# Patient Record
Sex: FEMALE | Race: WHITE | NOT HISPANIC OR LATINO
[De-identification: names, ages, dates, MRNs, and addresses within clinical notes are randomized per-mention and may not be internally consistent; named-entity substitution may affect disease eponyms.]

---

## 2017-01-20 ENCOUNTER — APPOINTMENT (OUTPATIENT)
Dept: PAIN MANAGEMENT | Facility: CLINIC | Age: 82
End: 2017-01-20

## 2017-01-20 VITALS
HEART RATE: 83 BPM | DIASTOLIC BLOOD PRESSURE: 80 MMHG | SYSTOLIC BLOOD PRESSURE: 131 MMHG | WEIGHT: 138 LBS | HEIGHT: 61.5 IN | BODY MASS INDEX: 25.72 KG/M2

## 2017-02-06 ENCOUNTER — RX RENEWAL (OUTPATIENT)
Age: 82
End: 2017-02-06

## 2017-03-06 ENCOUNTER — RX RENEWAL (OUTPATIENT)
Age: 82
End: 2017-03-06

## 2017-04-21 ENCOUNTER — APPOINTMENT (OUTPATIENT)
Dept: PAIN MANAGEMENT | Facility: CLINIC | Age: 82
End: 2017-04-21

## 2017-04-21 VITALS
SYSTOLIC BLOOD PRESSURE: 125 MMHG | WEIGHT: 138 LBS | HEIGHT: 61.5 IN | HEART RATE: 76 BPM | BODY MASS INDEX: 25.72 KG/M2 | DIASTOLIC BLOOD PRESSURE: 76 MMHG

## 2018-01-25 ENCOUNTER — RX RENEWAL (OUTPATIENT)
Age: 83
End: 2018-01-25

## 2022-12-06 ENCOUNTER — APPOINTMENT (OUTPATIENT)
Dept: CT IMAGING | Facility: CLINIC | Age: 87
End: 2022-12-06

## 2022-12-06 ENCOUNTER — OUTPATIENT (OUTPATIENT)
Dept: OUTPATIENT SERVICES | Facility: HOSPITAL | Age: 87
LOS: 1 days | End: 2022-12-06
Payer: MEDICARE

## 2022-12-06 DIAGNOSIS — Z00.8 ENCOUNTER FOR OTHER GENERAL EXAMINATION: ICD-10-CM

## 2022-12-06 PROCEDURE — 71260 CT THORAX DX C+: CPT | Mod: 26,MH

## 2022-12-06 PROCEDURE — 70491 CT SOFT TISSUE NECK W/DYE: CPT | Mod: MH

## 2022-12-06 PROCEDURE — 70491 CT SOFT TISSUE NECK W/DYE: CPT | Mod: 26,MH

## 2022-12-06 PROCEDURE — 71260 CT THORAX DX C+: CPT | Mod: MH

## 2023-06-10 ENCOUNTER — INPATIENT (INPATIENT)
Facility: HOSPITAL | Age: 88
LOS: 5 days | Discharge: HOME | DRG: 177 | End: 2023-06-16
Attending: INTERNAL MEDICINE | Admitting: INTERNAL MEDICINE
Payer: MEDICARE

## 2023-06-10 VITALS
HEIGHT: 63 IN | TEMPERATURE: 99 F | SYSTOLIC BLOOD PRESSURE: 118 MMHG | RESPIRATION RATE: 20 BRPM | DIASTOLIC BLOOD PRESSURE: 79 MMHG | OXYGEN SATURATION: 97 % | HEART RATE: 89 BPM | WEIGHT: 128.09 LBS

## 2023-06-10 LAB
ALBUMIN SERPL ELPH-MCNC: 3.7 G/DL — SIGNIFICANT CHANGE UP (ref 3.3–5)
ALP SERPL-CCNC: 79 U/L — SIGNIFICANT CHANGE UP (ref 40–120)
ALT FLD-CCNC: 13 U/L — SIGNIFICANT CHANGE UP (ref 10–45)
ANION GAP SERPL CALC-SCNC: 18 MMOL/L — HIGH (ref 5–17)
AST SERPL-CCNC: 38 U/L — SIGNIFICANT CHANGE UP (ref 10–40)
BASOPHILS # BLD AUTO: 0.04 K/UL — SIGNIFICANT CHANGE UP (ref 0–0.2)
BASOPHILS NFR BLD AUTO: 0.5 % — SIGNIFICANT CHANGE UP (ref 0–2)
BILIRUB SERPL-MCNC: 1 MG/DL — SIGNIFICANT CHANGE UP (ref 0.2–1.2)
BUN SERPL-MCNC: 28 MG/DL — HIGH (ref 7–23)
CALCIUM SERPL-MCNC: 10.1 MG/DL — SIGNIFICANT CHANGE UP (ref 8.4–10.5)
CHLORIDE SERPL-SCNC: 99 MMOL/L — SIGNIFICANT CHANGE UP (ref 96–108)
CO2 SERPL-SCNC: 23 MMOL/L — SIGNIFICANT CHANGE UP (ref 22–31)
CREAT SERPL-MCNC: 0.56 MG/DL — SIGNIFICANT CHANGE UP (ref 0.5–1.3)
EGFR: 88 ML/MIN/1.73M2 — SIGNIFICANT CHANGE UP
EOSINOPHIL # BLD AUTO: 0.08 K/UL — SIGNIFICANT CHANGE UP (ref 0–0.5)
EOSINOPHIL NFR BLD AUTO: 1.1 % — SIGNIFICANT CHANGE UP (ref 0–6)
GLUCOSE SERPL-MCNC: 117 MG/DL — HIGH (ref 70–99)
HCT VFR BLD CALC: 37.3 % — SIGNIFICANT CHANGE UP (ref 34.5–45)
HGB BLD-MCNC: 12.5 G/DL — SIGNIFICANT CHANGE UP (ref 11.5–15.5)
IMM GRANULOCYTES NFR BLD AUTO: 0.3 % — SIGNIFICANT CHANGE UP (ref 0–0.9)
LYMPHOCYTES # BLD AUTO: 0.63 K/UL — LOW (ref 1–3.3)
LYMPHOCYTES # BLD AUTO: 8.4 % — LOW (ref 13–44)
MCHC RBC-ENTMCNC: 31.6 PG — SIGNIFICANT CHANGE UP (ref 27–34)
MCHC RBC-ENTMCNC: 33.5 GM/DL — SIGNIFICANT CHANGE UP (ref 32–36)
MCV RBC AUTO: 94.2 FL — SIGNIFICANT CHANGE UP (ref 80–100)
MONOCYTES # BLD AUTO: 0.57 K/UL — SIGNIFICANT CHANGE UP (ref 0–0.9)
MONOCYTES NFR BLD AUTO: 7.6 % — SIGNIFICANT CHANGE UP (ref 2–14)
NEUTROPHILS # BLD AUTO: 6.18 K/UL — SIGNIFICANT CHANGE UP (ref 1.8–7.4)
NEUTROPHILS NFR BLD AUTO: 82.1 % — HIGH (ref 43–77)
NRBC # BLD: 0 /100 WBCS — SIGNIFICANT CHANGE UP (ref 0–0)
PLATELET # BLD AUTO: 247 K/UL — SIGNIFICANT CHANGE UP (ref 150–400)
POTASSIUM SERPL-MCNC: 5.1 MMOL/L — SIGNIFICANT CHANGE UP (ref 3.5–5.3)
POTASSIUM SERPL-SCNC: 5.1 MMOL/L — SIGNIFICANT CHANGE UP (ref 3.5–5.3)
PROT SERPL-MCNC: 8.5 G/DL — HIGH (ref 6–8.3)
RBC # BLD: 3.96 M/UL — SIGNIFICANT CHANGE UP (ref 3.8–5.2)
RBC # FLD: 13.9 % — SIGNIFICANT CHANGE UP (ref 10.3–14.5)
SODIUM SERPL-SCNC: 140 MMOL/L — SIGNIFICANT CHANGE UP (ref 135–145)
WBC # BLD: 7.52 K/UL — SIGNIFICANT CHANGE UP (ref 3.8–10.5)
WBC # FLD AUTO: 7.52 K/UL — SIGNIFICANT CHANGE UP (ref 3.8–10.5)

## 2023-06-10 PROCEDURE — 71045 X-RAY EXAM CHEST 1 VIEW: CPT | Mod: 26

## 2023-06-10 PROCEDURE — 99285 EMERGENCY DEPT VISIT HI MDM: CPT

## 2023-06-10 RX ORDER — CEFTRIAXONE 500 MG/1
1000 INJECTION, POWDER, FOR SOLUTION INTRAMUSCULAR; INTRAVENOUS ONCE
Refills: 0 | Status: COMPLETED | OUTPATIENT
Start: 2023-06-10 | End: 2023-06-10

## 2023-06-10 RX ORDER — AZITHROMYCIN 500 MG/1
500 TABLET, FILM COATED ORAL ONCE
Refills: 0 | Status: COMPLETED | OUTPATIENT
Start: 2023-06-10 | End: 2023-06-10

## 2023-06-10 NOTE — ED PROVIDER NOTE - PHYSICAL EXAMINATION
Constitutional: VS reviewed. Alert and orientedx3, no apparent distress, pt has gargled speech when talking  HEENT: Atraumatic, EOMI  CV: RRR  Lungs: + course rhonchi in all lung fields. Pt on 2L NC.   Abdomen: Soft, nondistended, nontender  MSK: No deformities  Skin: Warm and dry. As visualized no rashes, lesions, bruising or erythema  Neuro: Appears nonfocal  Lymph: No pitting edema in extremities. Constitutional: VS reviewed. Alert and orientedx3, no apparent distress, pt has gargled speech when talking  HEENT: Atraumatic, EOMI, white exudate on tongue and oropharynx that can be scraped off with tongue depressor   CV: RRR, harsh holosystolic murmur   Lungs: + course rhonchi in all lung fields. Pt on 2L NC.   Abdomen: Soft, nondistended, nontender  MSK: No deformities  Skin: Warm and dry. As visualized no rashes, lesions, bruising or erythema  Neuro: Appears nonfocal  Lymph: No pitting edema in extremities.

## 2023-06-10 NOTE — ED ADULT NURSE NOTE - NSFALLHARMRISKINTERV_ED_ALL_ED
Assistance OOB with selected safe patient handling equipment if applicable/Assistance with ambulation/Communicate risk of Fall with Harm to all staff, patient, and family/Monitor gait and stability/Provide visual cue: red socks, yellow wristband, yellow gown, etc/Reinforce activity limits and safety measures with patient and family/Bed in lowest position, wheels locked, appropriate side rails in place/Call bell, personal items and telephone in reach/Instruct patient to call for assistance before getting out of bed/chair/stretcher/Non-slip footwear applied when patient is off stretcher/[place] to call system/Physically safe environment - no spills, clutter or unnecessary equipment/Purposeful Proactive Rounding/Room/bathroom lighting operational, light cord in reach

## 2023-06-10 NOTE — ED PROVIDER NOTE - ATTENDING CONTRIBUTION TO CARE
Attending MD Nagy: I personally have seen and examined this patient.  Resident note reviewed and agree on plan of care and except where noted.  See below for details.     Seen in [facility], accompanied by daughter  ENT Dr. Timi Langford  PMD Dr. Dave Bowden    88F with PMH/PSH including HTN, thrush on Fluconazole, also on Mercaptopurine presents to the ED with one week of "thrush" and difficulty swallowing.  Patient requested daughter provide HPI.  Patient was able to spell her name and provide her .  Daughter reports that last week patient began to sound junky with breathing and talking.  Reports was Rx'ed Fluconazole and Mercaptopurine by ENT.  Denies previous episode of thrush.  Denies immunosuppressant, history of HIV.  Reports since then has not really improved, has had minimal po intake since 23.  Reports pain with swallowing and while interview has been spitting into emesis bag.  Placed on O2 by EMS, denies use at home.  Denies chest pain, shortness of breath, abdominal pain, nausea, vomiting, diarrhea, urinary complaints, fevers, chills, recent illness.    Exam:   General: NAD. thin  HENT: head NCAT, airway patent, +white patches in mouth  Eyes: anicteric, no conjunctival injection   Lungs: lungs with crackles and rhonchi all lung fields  Cardiac: +S1S2, +murmur, no r/g  GI: abdomen soft with +BS, NT, ND  : no CVAT  MSK: ranging neck freely, no calf tenderness, swelling, erythema or warmth   Neuro: moving all extremities spontaneously, nonfocal  Psych: normal mood and affect     A/P: 88F with     TO BE COMPLETED Attending MD Nagy: I personally have seen and examined this patient.  Resident note reviewed and agree on plan of care and except where noted.  See below for details.     Seen in [facility], accompanied by daughter  ENT Dr. Timi Langford  PMD Dr. Dave Bowden    88F with PMH/PSH including HTN, thrush on Fluconazole, also on Mercaptopurine presents to the ED with one week of "thrush" and difficulty swallowing.  Patient requested daughter provide HPI.  Patient was able to spell her name and provide her .  Daughter reports that last week patient began to sound junky with breathing and talking.  Reports was Rx'ed Fluconazole and Mercaptopurine by ENT.  Denies previous episode of thrush.  Denies immunosuppressant, history of HIV.  Reports since then has not really improved, has had minimal po intake since 23.  Reports pain with swallowing and while interview has been spitting into emesis bag.  Placed on O2 by EMS, denies use at home.  Denies chest pain, shortness of breath, abdominal pain, nausea, vomiting, diarrhea, urinary complaints, fevers, chills, recent illness.    Exam:   General: NAD. thin  HENT: head NCAT, airway patent, +white patches in mouth  Eyes: anicteric, no conjunctival injection   Lungs: lungs with crackles and rhonchi all lung fields  Cardiac: +S1S2, +murmur, no r/g  GI: abdomen soft with +BS, NT, ND  : no CVAT  MSK: ranging neck freely, no calf tenderness, swelling, erythema or warmth   Neuro: moving all extremities spontaneously, nonfocal  Psych: normal mood and affect     A/P: 88F with    TO BE COMPLETED

## 2023-06-10 NOTE — ED PROVIDER NOTE - OBJECTIVE STATEMENT
88-year-old female with past medical history of HTN presents emergency department for 1 week of oral thrush and difficulty swallowing.  Patient is accompanied by daughter who states patient has sounded "junky when breathing and talking" for last week. Pt was placed on 2 antifungals, fluconazole and mercaptopurol 1 week ago.  Patient has had difficulty swallowing as well.  Denies fevers, chills headache chest pain, abdominal pain, nausea/vomiting, diarrhea/constipation, dysuria, hematuria.

## 2023-06-10 NOTE — ED ADULT NURSE NOTE - OBJECTIVE STATEMENT
88 y.o. F A&Ox4 PMHx of HTN presenting to ED via walk-in triage for mouth pain and phlegm. Pt daughter at bedside to help with history, as pt is having difficulty speaking. Pt began having mouth pain 1 week ago, and was dx with thrush from her ENT. Pt prescribed antifungals 88 y.o. F A&Ox4 PMHx of HTN presenting to ED via walk-in triage for mouth pain and phlegm. Pt daughter at bedside to help with history, as pt is having difficulty speaking. Pt began having mouth pain 1 week ago, and was dx with thrush from her ENT. Pt prescribed antifungals, but due to mouth pain was unable to fully take the medication. Pt went to see PCP due to not improving and was sent to ED for eval. Pt has not been able to eat for three days. Pt having frequent productive cough of clear sputum. Pt denies fever/chills, H/A, lightheadedness/dizziness, vision changes, SOB, chest pain, abd pain, N/V/D, constipation, dysuria, hematuria, hematochezia, weakness, numbness, and tingling. Upon assessment, pt alert, grossly neurologically intact, and well appearing. Pupils PERRLA and no drainage noted. Airway patent, chest rise symmetrical with rales bilateral L/S, and pt is tachypneic. Skin is warm, dry, and normal for race. No cyanosis noted to lips or fingernails. Mucous membranes dry. Negative clubbed fingernails. Radial pulses equal and +2 bilaterally. Abd soft, nontender, nondistended. ROM intact and strength +5 in all four extremities. No edema noted to bilateral legs or arms. Pt placed on 2 L NC SATing 96% and cardiac monitor NSR in the 80s. Pt resting in stretcher in position of comfort. Stretcher locked and lowered and call bell within reach.

## 2023-06-10 NOTE — ED PROVIDER NOTE - CLINICAL SUMMARY MEDICAL DECISION MAKING FREE TEXT BOX
88-year-old female with past medical history of HTN presents emergency department for 1 week of oral thrush and difficulty swallowing.  Pt on 2 antifungals. Pt noted to have muffled voice when talking. + course rhonchi in all lungs fields. + harsh murmur. Differentials include but not limited to sepsis, PNA, HIV, candida esophagitis. Plan for labs, EKG, CXR, blood cultures. Dispo likely admission.

## 2023-06-11 DIAGNOSIS — K13.79 OTHER LESIONS OF ORAL MUCOSA: ICD-10-CM

## 2023-06-11 DIAGNOSIS — B37.0 CANDIDAL STOMATITIS: ICD-10-CM

## 2023-06-11 LAB
APPEARANCE UR: CLEAR — SIGNIFICANT CHANGE UP
BACTERIA # UR AUTO: NEGATIVE — SIGNIFICANT CHANGE UP
BILIRUB UR-MCNC: ABNORMAL
COLOR SPEC: YELLOW — SIGNIFICANT CHANGE UP
DIFF PNL FLD: NEGATIVE — SIGNIFICANT CHANGE UP
EPI CELLS # UR: 1 /HPF — SIGNIFICANT CHANGE UP
GLUCOSE UR QL: NEGATIVE — SIGNIFICANT CHANGE UP
HYALINE CASTS # UR AUTO: 0 /LPF — SIGNIFICANT CHANGE UP (ref 0–2)
KETONES UR-MCNC: ABNORMAL
LEUKOCYTE ESTERASE UR-ACNC: NEGATIVE — SIGNIFICANT CHANGE UP
NITRITE UR-MCNC: NEGATIVE — SIGNIFICANT CHANGE UP
PH UR: 6 — SIGNIFICANT CHANGE UP (ref 5–8)
PROT UR-MCNC: ABNORMAL
RBC CASTS # UR COMP ASSIST: 3 /HPF — SIGNIFICANT CHANGE UP (ref 0–4)
SP GR SPEC: 1.03 — SIGNIFICANT CHANGE UP (ref 1.01–1.02)
UROBILINOGEN FLD QL: SIGNIFICANT CHANGE UP
WBC UR QL: 3 /HPF — SIGNIFICANT CHANGE UP (ref 0–5)

## 2023-06-11 PROCEDURE — 71250 CT THORAX DX C-: CPT | Mod: 26,MA

## 2023-06-11 PROCEDURE — 70450 CT HEAD/BRAIN W/O DYE: CPT | Mod: 26

## 2023-06-11 RX ORDER — MERCAPTOPURINE 50 MG/1
50 TABLET ORAL DAILY
Refills: 0 | Status: DISCONTINUED | OUTPATIENT
Start: 2023-06-11 | End: 2023-06-14

## 2023-06-11 RX ORDER — SODIUM CHLORIDE 9 MG/ML
1000 INJECTION, SOLUTION INTRAVENOUS
Refills: 0 | Status: DISCONTINUED | OUTPATIENT
Start: 2023-06-11 | End: 2023-06-14

## 2023-06-11 RX ORDER — MERCAPTOPURINE 50 MG/1
1 TABLET ORAL
Refills: 0 | DISCHARGE

## 2023-06-11 RX ORDER — FLUCONAZOLE 150 MG/1
100 TABLET ORAL EVERY 24 HOURS
Refills: 0 | Status: DISCONTINUED | OUTPATIENT
Start: 2023-06-12 | End: 2023-06-16

## 2023-06-11 RX ORDER — CLOTRIMAZOLE 10 MG
1 TROCHE MUCOUS MEMBRANE
Refills: 0 | Status: COMPLETED | OUTPATIENT
Start: 2023-06-11 | End: 2023-06-16

## 2023-06-11 RX ORDER — MESALAMINE 400 MG
2 TABLET, DELAYED RELEASE (ENTERIC COATED) ORAL
Refills: 0 | DISCHARGE

## 2023-06-11 RX ORDER — PIPERACILLIN AND TAZOBACTAM 4; .5 G/20ML; G/20ML
3.38 INJECTION, POWDER, LYOPHILIZED, FOR SOLUTION INTRAVENOUS ONCE
Refills: 0 | Status: COMPLETED | OUTPATIENT
Start: 2023-06-11 | End: 2023-06-11

## 2023-06-11 RX ORDER — MESALAMINE 400 MG
1000 TABLET, DELAYED RELEASE (ENTERIC COATED) ORAL
Refills: 0 | Status: DISCONTINUED | OUTPATIENT
Start: 2023-06-11 | End: 2023-06-11

## 2023-06-11 RX ORDER — FLUCONAZOLE 150 MG/1
100 TABLET ORAL ONCE
Refills: 0 | Status: COMPLETED | OUTPATIENT
Start: 2023-06-11 | End: 2023-06-11

## 2023-06-11 RX ORDER — LOSARTAN POTASSIUM 100 MG/1
25 TABLET, FILM COATED ORAL DAILY
Refills: 0 | Status: DISCONTINUED | OUTPATIENT
Start: 2023-06-11 | End: 2023-06-14

## 2023-06-11 RX ORDER — FLUCONAZOLE 150 MG/1
2 TABLET ORAL
Refills: 0 | DISCHARGE

## 2023-06-11 RX ORDER — FLUCONAZOLE 150 MG/1
TABLET ORAL
Refills: 0 | Status: DISCONTINUED | OUTPATIENT
Start: 2023-06-11 | End: 2023-06-16

## 2023-06-11 RX ORDER — PANTOPRAZOLE SODIUM 20 MG/1
40 TABLET, DELAYED RELEASE ORAL
Refills: 0 | Status: DISCONTINUED | OUTPATIENT
Start: 2023-06-11 | End: 2023-06-16

## 2023-06-11 RX ORDER — PIPERACILLIN AND TAZOBACTAM 4; .5 G/20ML; G/20ML
3.38 INJECTION, POWDER, LYOPHILIZED, FOR SOLUTION INTRAVENOUS EVERY 8 HOURS
Refills: 0 | Status: DISCONTINUED | OUTPATIENT
Start: 2023-06-11 | End: 2023-06-11

## 2023-06-11 RX ORDER — CEFTRIAXONE 500 MG/1
1000 INJECTION, POWDER, FOR SOLUTION INTRAMUSCULAR; INTRAVENOUS EVERY 24 HOURS
Refills: 0 | Status: COMPLETED | OUTPATIENT
Start: 2023-06-11 | End: 2023-06-15

## 2023-06-11 RX ORDER — PIPERACILLIN AND TAZOBACTAM 4; .5 G/20ML; G/20ML
3.38 INJECTION, POWDER, LYOPHILIZED, FOR SOLUTION INTRAVENOUS ONCE
Refills: 0 | Status: DISCONTINUED | OUTPATIENT
Start: 2023-06-11 | End: 2023-06-11

## 2023-06-11 RX ORDER — RIVAROXABAN 15 MG-20MG
10 KIT ORAL DAILY
Refills: 0 | Status: DISCONTINUED | OUTPATIENT
Start: 2023-06-11 | End: 2023-06-16

## 2023-06-11 RX ADMIN — CEFTRIAXONE 100 MILLIGRAM(S): 500 INJECTION, POWDER, FOR SOLUTION INTRAMUSCULAR; INTRAVENOUS at 01:55

## 2023-06-11 RX ADMIN — Medication 1 LOZENGE: at 21:59

## 2023-06-11 RX ADMIN — PIPERACILLIN AND TAZOBACTAM 200 GRAM(S): 4; .5 INJECTION, POWDER, LYOPHILIZED, FOR SOLUTION INTRAVENOUS at 12:35

## 2023-06-11 RX ADMIN — SODIUM CHLORIDE 50 MILLILITER(S): 9 INJECTION, SOLUTION INTRAVENOUS at 16:52

## 2023-06-11 RX ADMIN — Medication 100 MILLIGRAM(S): at 22:30

## 2023-06-11 RX ADMIN — CEFTRIAXONE 100 MILLIGRAM(S): 500 INJECTION, POWDER, FOR SOLUTION INTRAMUSCULAR; INTRAVENOUS at 16:16

## 2023-06-11 RX ADMIN — FLUCONAZOLE 50 MILLIGRAM(S): 150 TABLET ORAL at 13:07

## 2023-06-11 RX ADMIN — SODIUM CHLORIDE 50 MILLILITER(S): 9 INJECTION, SOLUTION INTRAVENOUS at 21:59

## 2023-06-11 RX ADMIN — Medication 1 LOZENGE: at 18:48

## 2023-06-11 RX ADMIN — AZITHROMYCIN 255 MILLIGRAM(S): 500 TABLET, FILM COATED ORAL at 03:05

## 2023-06-11 NOTE — H&P ADULT - HISTORY OF PRESENT ILLNESS
88F with PMH/PSH including HTN, Aortic Stenosis,  Vocal cord paralysis and dysphagia presents to the ED with one week of "thrush" and difficulty swallowing.   Daughter states ptn sounds " junky" when she is breathing and talking. Patient was able to spell her name and provide her .  Daughter reports ptn was Rx'ed w Fluconazole and Mercaptopurine by ENT.  Denies previous episode of thrush.  Denies immunosuppressant, history of HIV.  Reports since then has not really improved, Ptn has had minimal po intake since 23.  Reports pain with swallowing and while interview has been spitting into emesis bag.  Placed on O2 by EMS, denies use at home.  Denies chest pain, shortness of breath, abdominal pain, nausea, vomiting, diarrhea, urinary complaints, fevers, chills, recent illness.

## 2023-06-11 NOTE — CONSULT NOTE ADULT - PROBLEM SELECTOR RECOMMENDATION 9
- Pt discussed in detail with Dr. Rodriguez, plan to   - continue diflucan 100mg x1week   - consider speech and swallow eval   - no further ent intervention - Pt discussed in detail with Dr. Rodriguez, plan to   - continue diflucan 100mg x1week   - ppi x6 weeks   - consider speech and swallow eval   - no further ent intervention

## 2023-06-11 NOTE — H&P ADULT - ASSESSMENT
88F with PMH/PSH including HTN, Aortic Stenosis,  Vocal cord paralysis and dysphagia presents to the ED with one week of "thrush" and difficulty swallowing.   Daughter states ptn sounds " junky" when she is breathing and talking. Patient was able to spell her name and provide her .  Daughter reports ptn was Rx'ed w Fluconazole and Mercaptopurine by ENT.  Denies previous episode of thrush.  Denies immunosuppressant, history of HIV.  Reports since then has not really improved, Ptn has had minimal po intake since 23.  Reports pain with swallowing and while interview has been spitting into emesis bag.  Placed on O2 by EMS, denies use at home.  Denies chest pain, shortness of breath, abdominal pain, nausea, vomiting, diarrhea, urinary complaints, fevers, chills, recent illness.    A/P  Aspiration PNA: start ZOSYN, place on dysphagia diet while pending S&S eval  Thrush: seen by ent PTA, will cont Diflucan, get ENT eval here to address dysphagia and Vocal cord paralysis  Ascending Aortic Aneurism and h/o AS: get prt TTE, she hasnt had one in a while  Volume depetion: place on IVF  Dysphagia: place on dysphagia diet  HTN: cont LOSARTAN  DVT ppx w po xarelto

## 2023-06-11 NOTE — H&P ADULT - NSICDXPASTSURGICALHX_GEN_ALL_CORE_FT
PAST SURGICAL HISTORY:  C Section (ICD9 669.70)     C Section (ICD9 669.70)     C Section (ICD9 669.70)     C Section (ICD9 669.70)

## 2023-06-11 NOTE — H&P ADULT - NSHPPHYSICALEXAM_GEN_ALL_CORE
T(C): 36.9 (06-11-23 @ 04:27), Max: 37.3 (06-10-23 @ 21:25)  HR: 84 (06-11-23 @ 04:27) (84 - 90)  BP: 104/70 (06-11-23 @ 04:27) (104/70 - 134/80)  RR: 20 (06-11-23 @ 04:27) (20 - 33)  SpO2: 95% (06-11-23 @ 04:27) (90% - 100%)    PHYSICAL EXAM:  GENERAL: NAD, well-developed  HEAD:  Atraumatic, Normocephalic  EYES: EOMI, PERRLA, conjunctiva and sclera clear  NECK: Supple, No JVD  CHEST/LUNG: Clear to auscultation bilaterally; No wheeze  HEART: Regular rate and rhythm; No murmurs, rubs, or gallops  ABDOMEN: Soft, Nontender, Nondistended; Bowel sounds present  EXTREMITIES:  2+ Peripheral Pulses, No clubbing, cyanosis, or edema  PSYCH: AAOx3  NEUROLOGY: non-focal  SKIN: No rashes or lesions

## 2023-06-11 NOTE — ED ADULT NURSE REASSESSMENT NOTE - NS ED NURSE REASSESS COMMENT FT1
Straight cath attempted without success. Pt states she can try to urinate for clean catch, ED ATT Lilo aware. Clean catch sample successful, and sent to lab.

## 2023-06-11 NOTE — CONSULT NOTE ADULT - ASSESSMENT
88y with oral thrush already on treatment, pending indirect laryngoscopy  88y with mild oral thrush already on treatment, indirect laryngoscopy reveals minimal laryngeal involvement normal vocal cords, minimal posterior arytenoid edema noted on indirect laryngoscopy, likely 2/2 GERD

## 2023-06-11 NOTE — CONSULT NOTE ADULT - ASSESSMENT
88F with PMH/PSH including HTN, Aortic Stenosis,  Vocal cord paralysis and dysphagia presents to the ED with one week of "thrush" and difficulty swallowing.   Daughter states ptn sounds " junky" when she is breathing and talking. Patient was able to spell her name and provide her .  Daughter reports ptn was Rx'ed w Fluconazole and Mercaptopurine by ENT.  Denies previous episode of thrush.  Denies immunosuppressant, history of HIV.  Reports since then has not really improved, Ptn has had minimal po intake since 23.  Reports pain with swallowing and while interview has been spitting into emesis bag.  Placed on O2 by EMS, denies use at home.  Denies chest pain, shortness of breath, abdominal pain, nausea, vomiting, diarrhea, urinary complaints, fevers, chills, recent illness.    A/P  Aspiration PNA:   Thrush: seen by ent PTA, will cont Diflucan, get ENT eval here to address dysphagia and Vocal cord paralysis  Ascending Aortic Aneurism and h/o AS:   Dysphagia: place on dysphagia diet  HTN:   DVT      :    Aspiration PNA: on zosyn: no gross consolidation: wbc is normal :;  no fever: check procal:  ? short course of antibiotics'   Pulmonary fibrosis: She seems to have pulm fibrosis:  it was seen last time in 2022: there was no follow up after that  : I am not sure if pts family is aware about it:  recent ct scan showed: pulm fibrosis again : will dw family and will need to decide about her home oxygen requirement: check abg: in am   Thrush: seen by ent PTA, will cont Diflucan, get ENT eval here to address dysphagia and Vocal cord paralysis  Ascending Aortic Aneurism and h/o AS: echo awaited  Dysphagia: place on dysphagia diet  HTN:  controlled  DVT ; on xarelto    dw acp

## 2023-06-11 NOTE — PROGRESS NOTE ADULT - SUBJECTIVE AND OBJECTIVE BOX
RAUL [id number]  88yFemale  T(C): 37.1 (06-11-23 @ 21:18), Max: 37.1 (06-11-23 @ 21:18)  HR: 79 (06-11-23 @ 21:18) (66 - 84)  BP: 99/63 (06-11-23 @ 21:18) (95/60 - 113/70)  RR: 20 (06-11-23 @ 21:18) (18 - 26)  SpO2: 94% (06-11-23 @ 21:18) (90% - 99%)  Wt(kg): --

## 2023-06-11 NOTE — H&P ADULT - NSICDXPASTMEDICALHX_GEN_ALL_CORE_FT
PAST MEDICAL HISTORY:  Aortic Stenosis (ICD9 424.1)     Benign Hypertension (ICD9 401.1)     Chronic Osteoarthritis (ICD9 715.90)     Crohn's Disease of Intestine (ICD9 555.9)     HLD (Hyperlipidemia) (ICD9 272.4)     Mitral Regurgitation (ICD9 424.0)     Osteopenia (ICD9 733.90)

## 2023-06-12 LAB
ANION GAP SERPL CALC-SCNC: 12 MMOL/L — SIGNIFICANT CHANGE UP (ref 5–17)
APPEARANCE UR: ABNORMAL
BACTERIA # UR AUTO: NEGATIVE — SIGNIFICANT CHANGE UP
BASE EXCESS BLDA CALC-SCNC: 8.8 MMOL/L — HIGH (ref -2–3)
BILIRUB UR-MCNC: NEGATIVE — SIGNIFICANT CHANGE UP
BUN SERPL-MCNC: 22 MG/DL — SIGNIFICANT CHANGE UP (ref 7–23)
CALCIUM SERPL-MCNC: 9.2 MG/DL — SIGNIFICANT CHANGE UP (ref 8.4–10.5)
CHLORIDE SERPL-SCNC: 101 MMOL/L — SIGNIFICANT CHANGE UP (ref 96–108)
CO2 BLDA-SCNC: 34 MMOL/L — HIGH (ref 19–24)
CO2 SERPL-SCNC: 27 MMOL/L — SIGNIFICANT CHANGE UP (ref 22–31)
COLOR SPEC: YELLOW — SIGNIFICANT CHANGE UP
CREAT SERPL-MCNC: 0.5 MG/DL — SIGNIFICANT CHANGE UP (ref 0.5–1.3)
CULTURE RESULTS: SIGNIFICANT CHANGE UP
DIFF PNL FLD: NEGATIVE — SIGNIFICANT CHANGE UP
EGFR: 90 ML/MIN/1.73M2 — SIGNIFICANT CHANGE UP
EPI CELLS # UR: 1 /HPF — SIGNIFICANT CHANGE UP
GAS PNL BLDA: SIGNIFICANT CHANGE UP
GLUCOSE SERPL-MCNC: 85 MG/DL — SIGNIFICANT CHANGE UP (ref 70–99)
GLUCOSE UR QL: NEGATIVE — SIGNIFICANT CHANGE UP
GRAM STN FLD: SIGNIFICANT CHANGE UP
HCO3 BLDA-SCNC: 33 MMOL/L — HIGH (ref 21–28)
HCT VFR BLD CALC: 32.3 % — LOW (ref 34.5–45)
HGB BLD-MCNC: 10.8 G/DL — LOW (ref 11.5–15.5)
HYALINE CASTS # UR AUTO: 2 /LPF — SIGNIFICANT CHANGE UP (ref 0–2)
KETONES UR-MCNC: SIGNIFICANT CHANGE UP
LEGIONELLA AG UR QL: NEGATIVE — SIGNIFICANT CHANGE UP
LEUKOCYTE ESTERASE UR-ACNC: NEGATIVE — SIGNIFICANT CHANGE UP
MCHC RBC-ENTMCNC: 31.5 PG — SIGNIFICANT CHANGE UP (ref 27–34)
MCHC RBC-ENTMCNC: 33.4 GM/DL — SIGNIFICANT CHANGE UP (ref 32–36)
MCV RBC AUTO: 94.2 FL — SIGNIFICANT CHANGE UP (ref 80–100)
MRSA PCR RESULT.: SIGNIFICANT CHANGE UP
NITRITE UR-MCNC: NEGATIVE — SIGNIFICANT CHANGE UP
NRBC # BLD: 0 /100 WBCS — SIGNIFICANT CHANGE UP (ref 0–0)
PCO2 BLDA: 41 MMHG — SIGNIFICANT CHANGE UP (ref 32–45)
PH BLDA: 7.51 — HIGH (ref 7.35–7.45)
PH UR: 6 — SIGNIFICANT CHANGE UP (ref 5–8)
PLATELET # BLD AUTO: 235 K/UL — SIGNIFICANT CHANGE UP (ref 150–400)
PO2 BLDA: 98 MMHG — SIGNIFICANT CHANGE UP (ref 83–108)
POTASSIUM SERPL-MCNC: 3.1 MMOL/L — LOW (ref 3.5–5.3)
POTASSIUM SERPL-SCNC: 3.1 MMOL/L — LOW (ref 3.5–5.3)
PROT UR-MCNC: ABNORMAL
RBC # BLD: 3.43 M/UL — LOW (ref 3.8–5.2)
RBC # FLD: 13.3 % — SIGNIFICANT CHANGE UP (ref 10.3–14.5)
RBC CASTS # UR COMP ASSIST: 2 /HPF — SIGNIFICANT CHANGE UP (ref 0–4)
S AUREUS DNA NOSE QL NAA+PROBE: SIGNIFICANT CHANGE UP
SAO2 % BLDA: 99.7 % — HIGH (ref 94–98)
SODIUM SERPL-SCNC: 140 MMOL/L — SIGNIFICANT CHANGE UP (ref 135–145)
SP GR SPEC: 1.04 — HIGH (ref 1.01–1.02)
SPECIMEN SOURCE: SIGNIFICANT CHANGE UP
SPECIMEN SOURCE: SIGNIFICANT CHANGE UP
UROBILINOGEN FLD QL: NEGATIVE — SIGNIFICANT CHANGE UP
WBC # BLD: 6.17 K/UL — SIGNIFICANT CHANGE UP (ref 3.8–10.5)
WBC # FLD AUTO: 6.17 K/UL — SIGNIFICANT CHANGE UP (ref 3.8–10.5)
WBC UR QL: 2 /HPF — SIGNIFICANT CHANGE UP (ref 0–5)

## 2023-06-12 PROCEDURE — 99232 SBSQ HOSP IP/OBS MODERATE 35: CPT

## 2023-06-12 RX ADMIN — RIVAROXABAN 10 MILLIGRAM(S): KIT at 13:03

## 2023-06-12 RX ADMIN — Medication 1 LOZENGE: at 02:30

## 2023-06-12 RX ADMIN — CEFTRIAXONE 100 MILLIGRAM(S): 500 INJECTION, POWDER, FOR SOLUTION INTRAMUSCULAR; INTRAVENOUS at 18:22

## 2023-06-12 RX ADMIN — MERCAPTOPURINE 50 MILLIGRAM(S): 50 TABLET ORAL at 13:03

## 2023-06-12 RX ADMIN — Medication 1 LOZENGE: at 13:03

## 2023-06-12 RX ADMIN — FLUCONAZOLE 50 MILLIGRAM(S): 150 TABLET ORAL at 13:04

## 2023-06-12 RX ADMIN — SODIUM CHLORIDE 50 MILLILITER(S): 9 INJECTION, SOLUTION INTRAVENOUS at 06:12

## 2023-06-12 RX ADMIN — PANTOPRAZOLE SODIUM 40 MILLIGRAM(S): 20 TABLET, DELAYED RELEASE ORAL at 06:12

## 2023-06-12 RX ADMIN — LOSARTAN POTASSIUM 25 MILLIGRAM(S): 100 TABLET, FILM COATED ORAL at 06:12

## 2023-06-12 NOTE — PROGRESS NOTE ADULT - ASSESSMENT
88F with PMH/PSH including HTN, Aortic Stenosis,  Vocal cord paralysis and dysphagia presents to the ED with one week of "thrush" and difficulty swallowing.   Daughter states ptn sounds " junky" when she is breathing and talking. Patient was able to spell her name and provide her .  Daughter reports ptn was Rx'ed w Fluconazole and Mercaptopurine by ENT.  Denies previous episode of thrush.  Denies immunosuppressant, history of HIV.  Reports since then has not really improved, Ptn has had minimal po intake since 23.  Reports pain with swallowing and while interview has been spitting into emesis bag.  Placed on O2 by EMS, denies use at home.  Denies chest pain, shortness of breath, abdominal pain, nausea, vomiting, diarrhea, urinary complaints, fevers, chills, recent illness.    A/P  Aspiration PNA: seen by ID and pulm.   on dysphagia diet while pending S&S eval  Thrush: seen by ent PTA, will cont Diflucan x 1 week, ENT eval here DONE: no vocal cord paralysis was observed.   Ascending Aortic Aneurism   h/o AS: get rpt TTE, she hasnt had one in a while  Volume depetion resolved post IVF, presently euvolemic  Dysphagia: cont on dysphagia diet  HTN: cont LOSARTAN  DVT ppx w po xarelto

## 2023-06-12 NOTE — PHYSICAL THERAPY INITIAL EVALUATION ADULT - PERTINENT HX OF CURRENT PROBLEM, REHAB EVAL
88F with PMH/PSH including HTN, Aortic Stenosis,  Vocal cord paralysis and dysphagia presents to the ED with one week of "thrush" and difficulty swallowing. Per chart  Daughter states ptn sounds " junky" when she is breathing and talking. Patient was able to spell her name and provide her .  Daughter reports pt was Rx'ed w Fluconazole and Mercaptopurine by ENT.  Denies previous episode of thrush.  Reports since then has not really improved, Pt has had minimal po intake since 23.  Reports pain with swallowing on admit;  Placed on O2 by EMS, denies use at home.  pH 7.51 and K 3.1 , Aspiration pneumonia (ID, Gastro, Neuro, Cardiac, Pulm following )  CT CHEST 23 Scattered ground glass nodular and patchy opacities as well as clusters of tree in bud type nodular opacities as described above. This is likely related to multifocal infection for which both typical and atypical   etiologies should be considered. Pattern of tree-in-bud type lower lobe nodules may suggest aspiration as well. Correlate clinically. Bronchitis/bronchiolitis. Stable ascending aortic dilatation. Stable 4 mm right upper lobe nodule .Mild fibrotic changes, stable .Cardiomegaly and small pericardial effusion.    HCT 12: no CT evidence of acute intracranial hemorrhage, mass effect or midline shift. Gray matter-white matter differentiation is grossly preserved. Mild generalized cerebral volume loss.  Minimal periventricular white matter hypoattenuation is not seen in etiology but likely reflects   chronic microvascular ischemic disease in this age group. There are intracranial atherosclerotic calcifications in intradural vertebral arteries bilaterally (L > R),at the vertebrobasilar junction/proximal basilar artery, and in ICA bilaterally. Again seen is mild tortuosity of the vertebrobasilar system. The distal vertebral arteries and vertebrobasilar junction abut the undersurface of the prem.

## 2023-06-12 NOTE — PROGRESS NOTE ADULT - ASSESSMENT
1) pt with dysphagia  2) etiology unclear agree with id may be oropharyngeal  3) continue current rx  4) speech and swallow re: feest  5) can consider esophagram if not diagnostic  6) ppi therapy

## 2023-06-12 NOTE — SWALLOW BEDSIDE ASSESSMENT ADULT - SWALLOW EVAL: DIAGNOSIS
Pt is an 87 y/o female w/ PMHx of HTN, Aortic Stenosis, Vocal cord paralysis and dysphagia presents to the ED with one week of "thrush" and difficulty swallowing. Of note, upon ENT indirect laryngoscopy pt p/w normal vocal cords, no paralysis. Pt p/w signs of suspected reduced secretion management and airway invasion with conservative textures. MBS recommended to assess swallow function and determine least restrictive diet.

## 2023-06-12 NOTE — PROGRESS NOTE ADULT - SUBJECTIVE AND OBJECTIVE BOX
Patient is a 88y old  Female who presents with a chief complaint of thrush (2023 08:39)      SUBJECTIVE / OVERNIGHT EVENTS: ptn is comfortable, euvolemic, does not appear dyspneic, tolerating a dysphagia diet. the aide states she has a good appetite at home generally. no vocal cord paralysis seen on ENT eval here. will cont Abx as per ID and Pulm. seen by neuro. no further neuro work up needed. head ct NEG. dc planning home w 24 hr live in aide    MEDICATIONS  (STANDING):  cefTRIAXone   IVPB 1000 milliGRAM(s) IV Intermittent every 24 hours  clotrimazole Lozenge 1 Lozenge Oral five times a day  fluconAZOLE IVPB      fluconAZOLE IVPB 100 milliGRAM(s) IV Intermittent every 24 hours  lactated ringers. 1000 milliLiter(s) (50 mL/Hr) IV Continuous <Continuous>  losartan 25 milliGRAM(s) Oral daily  mercaptopurine (Non - oncologic) 50 milliGRAM(s) Oral daily  pantoprazole    Tablet 40 milliGRAM(s) Oral before breakfast  rivaroxaban 10 milliGRAM(s) Oral daily    MEDICATIONS  (PRN):  guaiFENesin Oral Liquid (Sugar-Free) 100 milliGRAM(s) Oral every 6 hours PRN Cough      Vital Signs Last 24 Hrs  T(F): 98.9 (23 @ 05:46), Max: 98.9 (23 @ 05:46)  HR: 77 (23 @ 05:46) (66 - 79)  BP: 107/72 (23 @ 05:46) (95/60 - 113/70)  RR: 18 (23 @ 05:46) (18 - 20)  SpO2: 94% (23 @ 05:46) (94% - 99%)  Telemetry:   CAPILLARY BLOOD GLUCOSE        I&O's Summary    2023 07:01  -  2023 07:00  --------------------------------------------------------  IN: 720 mL / OUT: 200 mL / NET: 520 mL        PHYSICAL EXAM:  GENERAL: NAD, well-developed  HEAD:  Atraumatic, Normocephalic  EYES: EOMI, PERRLA, conjunctiva and sclera clear  NECK: Supple, No JVD  CHEST/LUNG: Clear to auscultation bilaterally; No wheeze  HEART: Regular rate and rhythm; No murmurs, rubs, or gallops  ABDOMEN: Soft, Nontender, Nondistended; Bowel sounds present  EXTREMITIES:  2+ Peripheral Pulses, No clubbing, cyanosis, or edema  PSYCH: AAOx3  NEUROLOGY: non-focal  SKIN: No rashes or lesions    LABS:                        10.8   6.17  )-----------( 235      ( 2023 05:54 )             32.3     06-12    140  |  101  |  22  ----------------------------<  85  3.1<L>   |  27  |  0.50    Ca    9.2      2023 05:53    TPro  8.5<H>  /  Alb  3.7  /  TBili  1.0  /  DBili  x   /  AST  38  /  ALT  13  /  AlkPhos  79  06-10          Urinalysis Basic - ( 2023 06:37 )    Color: Yellow / Appearance: Turbid / S.044 / pH: x  Gluc: x / Ketone: Trace  / Bili: Negative / Urobili: Negative   Blood: x / Protein: 100 mg/dL / Nitrite: Negative   Leuk Esterase: Negative / RBC: 2 /hpf / WBC 2 /HPF   Sq Epi: x / Non Sq Epi: x / Bacteria: Negative        RADIOLOGY & ADDITIONAL TESTS:    Imaging Personally Reviewed:    Consultant(s) Notes Reviewed:      Care Discussed with Consultants/Other Providers:

## 2023-06-12 NOTE — PHYSICAL THERAPY INITIAL EVALUATION ADULT - PREDICTED DURATION OF THERAPY (DAYS/WKS), PT EVAL
Patient presents for a screening [place] Tuberculin Skin Test for employment.  Does not have a history of BCG Vaccine.  PPD 5TU 0.1ml placed on Left forearm, 5/13/2023 at 11:35 AM.  Patient given instructions to return in 48 to 72 hours to have Skin Test read.  Gayathri Aly PA-C    Have you ever been told you were previously positive on a PPD Skin Test? [] Yes [x] No  Can you Return in 48 to 72 hours for the interpretation?                              [x] Yes  [] No    1. Have you had a productive cough in the last 3+ weeks? [] Yes  [x] No  2. Persistent weight loss?                                                    [] Yes [x] No  3.   Persistent low grade fever?                                             [] Yes [x] No  4.   Night sweats?                                                                  [] Yes [x] No  5.   Loss of appetite?                                                             [] Yes [x] No  6.   Swollen glands, usually in the neck?                               [] Yes [x] No  7.   Coughing up Blood?                                                        [] Yes [x] No  8.   Shortness of Breath?                                                       [] Yes [x] No  9.   Fatigue, weakness, malaise?                                           [] Yes [x] No  10. Chest Pain?                                                                      [] Yes [x] No  11. Chills?                                                                               [] Yes [x] No  12. Exposed to a known case of TB?                                      [] Yes [x] No  13. Hoarseness?                                                                     [] Yes [x] No  14. Recent travel?    
3 weeks

## 2023-06-12 NOTE — PHYSICAL THERAPY INITIAL EVALUATION ADULT - IMPAIRMENTS FOUND, PT EVAL
on RA 94 % w/ rest and 93 % with activity on room air for pulse ox/aerobic capacity/endurance/gait, locomotion, and balance/joint integrity and mobility/muscle strength/ventilation and respiration/gas exchange

## 2023-06-12 NOTE — PHYSICAL THERAPY INITIAL EVALUATION ADULT - GENERAL OBSERVATIONS, REHAB EVAL
pt received semisupine in bed all siderails up HOB 30 degrees call bell,phone and table in reach pt intially back on 2 L nc o2 datting 97% , nc O2 removed for session and monitored pt pt on RA 94 % at rest and with activity below 93 % on RA pt w/o SOB or increase WOB rn Lila informed

## 2023-06-12 NOTE — PHYSICAL THERAPY INITIAL EVALUATION ADULT - IMPAIRMENTS CONTRIBUTING IMPAIRED BED MOBILITY, REHAB EVAL
decrease endurance , sat several min w/o cc's cg of 1 EOB/impaired balance/impaired postural control/decreased strength

## 2023-06-12 NOTE — SWALLOW BEDSIDE ASSESSMENT ADULT - SWALLOW EVAL: CURRENT DIET
Puree/moderately thick liquids Puree/moderately thick liquids (Previously on soft-bite sized/thin liquids)

## 2023-06-12 NOTE — PHYSICAL THERAPY INITIAL EVALUATION ADULT - IMPAIRED TRANSFERS: SIT/STAND, REHAB EVAL
decrease endurance , sat EOB x several min work on deep breaths cg of 1 , balance fair - to fair/impaired balance/impaired postural control/decreased strength

## 2023-06-12 NOTE — PROGRESS NOTE ADULT - ASSESSMENT
89 yo woman with ibd, has developed thrush and was given po diflucan but despite this she has ongoing dysphagia and gurgling. CT suggest aspiration and poor pulmonary toilet. ENT here shows some mild thrush but vocal cords work ok and no other pathology.  Suspect she has aspiration pneumonia with partially treated thrush.   Unclear if the dysphagia was caused by thrush alone or if she has some other cause.   No recent hospitalizations  Recommendations:  on iv diflucan for now while dysphagia w/u in progress  cover pneumonia with ceftriaxone for now  awaiting further w/u  f/u sputum cultures

## 2023-06-12 NOTE — SWALLOW BEDSIDE ASSESSMENT ADULT - SWALLOW EVAL: SECRETION MANAGEMENT
strnading clear secretions from soft palate to BOT; Wet vocal quality upon encounter, however pt able to clear with cough/reswallow.

## 2023-06-12 NOTE — PROGRESS NOTE ADULT - SUBJECTIVE AND OBJECTIVE BOX
Patient is a 88y Female     Patient is a 88y old  Female who presents with a chief complaint of gi (2023 22:11)      HPI:  88F with PMH/PSH including HTN, Aortic Stenosis,  Vocal cord paralysis and dysphagia presents to the ED with one week of "thrush" and difficulty swallowing.   Daughter states ptn sounds " junky" when she is breathing and talking. Patient was able to spell her name and provide her .  Daughter reports ptn was Rx'ed w Fluconazole and Mercaptopurine by ENT.  Denies previous episode of thrush.  Denies immunosuppressant, history of HIV.  Reports since then has not really improved, Ptn has had minimal po intake since 23.  Reports pain with swallowing and while interview has been spitting into emesis bag.  Placed on O2 by EMS, denies use at home.  Denies chest pain, shortness of breath, abdominal pain, nausea, vomiting, diarrhea, urinary complaints, fevers, chills, recent illness. (2023 11:59)      PAST MEDICAL & SURGICAL HISTORY:  Benign Hypertension (ICD9 401.1)      HLD (Hyperlipidemia) (ICD9 272.4)      Crohn's Disease of Intestine (ICD9 555.9)      Mitral Regurgitation (ICD9 424.0)      Aortic Stenosis (ICD9 424.1)      Chronic Osteoarthritis (ICD9 715.90)      Osteopenia (ICD9 733.90)      C Section (ICD9 669.70)      C Section (ICD9 669.70)      C Section (ICD9 669.70)      C Section (ICD9 669.70)          MEDICATIONS  (STANDING):  cefTRIAXone   IVPB 1000 milliGRAM(s) IV Intermittent every 24 hours  clotrimazole Lozenge 1 Lozenge Oral five times a day  fluconAZOLE IVPB      fluconAZOLE IVPB 100 milliGRAM(s) IV Intermittent every 24 hours  lactated ringers. 1000 milliLiter(s) (50 mL/Hr) IV Continuous <Continuous>  losartan 25 milliGRAM(s) Oral daily  mercaptopurine (Non - oncologic) 50 milliGRAM(s) Oral daily  pantoprazole    Tablet 40 milliGRAM(s) Oral before breakfast  rivaroxaban 10 milliGRAM(s) Oral daily      Allergies    Bacitracin ointment (Rash)  No Known Drug Allergies  latex (Rash)    Intolerances        SOCIAL HISTORY:  Denies ETOh,Smoking,     FAMILY HISTORY:      REVIEW OF SYSTEMS:    CONSTITUTIONAL: No weakness, fevers or chills  EYES/ENT: No visual changes;  No vertigo or throat pain   NECK: No pain or stiffness  RESPIRATORY: No cough, wheezing, hemoptysis; No shortness of breath  CARDIOVASCULAR: No chest pain or palpitations  GASTROINTESTINAL: No abdominal or epigastric pain. No nausea, vomiting, or hematemesis; No diarrhea or constipation. No melena or hematochezia.  GENITOURINARY: No dysuria, frequency or hematuria  NEUROLOGICAL: No numbness or weakness  SKIN: No itching, burning, rashes, or lesions   All other review of systems is negative unless indicated above.    VITAL:  T(C): , Max: 37.2 (23 @ 05:46)  T(F): , Max: 98.9 (23 @ 05:46)  HR: 77 (23 @ 05:46)  BP: 107/72 (23 @ 05:46)  BP(mean): --  RR: 18 (23 @ 05:46)  SpO2: 94% (23 @ 05:46)  Wt(kg): --    I and O's:     @ 07:01  -   @ 07:00  --------------------------------------------------------  IN: 720 mL / OUT: 200 mL / NET: 520 mL          PHYSICAL EXAM:    Constitutional: NAD  HEENT: PERRLA,   Neck: No JVD  Respiratory: CTA B/L  Cardiovascular: S1 and S2  Gastrointestinal: BS+, soft, NT/ND  Extremities: No peripheral edema  Neurological: A/O x 3, no focal deficits  Psychiatric: Normal mood, normal affect  : No Bazzi  Skin: No rashes  Access: Not applicable  Back: No CVA tenderness    LABS:                        10.8   6.17  )-----------( 235      ( 2023 05:54 )             32.3     06-    140  |  101  |  22  ----------------------------<  85  3.1<L>   |  27  |  0.50    Ca    9.2      2023 05:53    TPro  8.5<H>  /  Alb  3.7  /  TBili  1.0  /  DBili  x   /  AST  38  /  ALT  13  /  AlkPhos  79  06-10          RADIOLOGY & ADDITIONAL STUDIES:

## 2023-06-12 NOTE — PHYSICAL THERAPY INITIAL EVALUATION ADULT - IMPAIRMENTS CONTRIBUTING TO GAIT DEVIATIONS, PT EVAL
decrease endurance , intermittent min unsteaady , pacing self as first time up ob with assist/impaired balance/impaired postural control/decreased strength

## 2023-06-12 NOTE — CONSULT NOTE ADULT - ASSESSMENT
This is a 88y year old Female with the below past medical history who presents with the chief complaint of dyspahgia.  including HTN, Aortic Stenosis,  Vocal cord paralysis (this is in the note from admission) and dysphagia presents to the ED with one week of "thrush" and difficulty swallowing.   Daughter states ptn sounds " junky" when she is breathing and talking. Patient was able to spell her name and provide her .  Daughter reports ptn was Rx'ed w Fluconazole and Mercaptopurine by ENT.  Denies previous episode of thrush.  Denies immunosuppressant, history of HIV.  Reports since then has not really improved, Ptn has had minimal po intake since 23.  Reports pain with swallowing and while interview has been spitting into emesis bag.    Pt seen by ENT while in house  their note is as follows:      Fiberoptic Indirect laryngoscopy:  (Scope #2 used)Reason for Laryngoscopy:    Patient was unable to cooperate with mirror.  Nasopharynx, oropharynx, and hypopharynx clear, no bleeding. Tongue base, posterior pharyngeal wall, vallecula, epiglottis, and subglottis appear normal. No erythema, edema, pooling of secretions, masses or lesions. Airway patent, no foreign body visualized. No glottic/supraglottic edema. True vocal cords, arytenoids, vestibular folds, ventricles, pyriform sinuses, and aryepiglottic folds appear normal bilaterally. Vocal cords mobile with good contact b/l. minimal thrush noted throughout     Pt denies any issues with speech, vision such as diplopia, new focal weakness/numbness, changes in bowel or bladder control.  She denies any hx of neurological disease      exam nonfocal    CT head negative    While initial notes write vocal cord paralysis, the ent note states vocal cords were mobile.   While dyphagia can be seen with neurological etiologies, the lack of other symptoms, findings on exam, and nl CT head made them much less likely.  In this population, dyphagia is often idiopathic  Additionally neurological causes of dyphagia would not cause thrush  Therefore would not endorse further neurological evaluation at this time  d/w pt at bedside  reconsult prn

## 2023-06-12 NOTE — CONSULT NOTE ADULT - TIME BILLING
Patient seen and examined, agree with the above assessment and plan by BLOSSOM ReyesF with PMH/PSH including HTN, Aortic Stenosis,  Vocal cord paralysis and dysphagia presents to the ED with one week of "thrush" and dysphagia.         #Aortic Stenosis  -Stable, no overload on exam  -Check echo    #Aortic Aneurysm  -3.7cm, Stable on CT  -No reports of cp, back pain, sob    #Candidal Esophagitis  -Fluconazole  -Mgmt per med, GI    #Dysphagia  -CT w/ concern for aspiration pna  -W/u per med, gi, neuro  -Abx per id

## 2023-06-12 NOTE — PHYSICAL THERAPY INITIAL EVALUATION ADULT - NAME OF DISCHARGE PLANNER
Mali Espinosa  [id number] CM, communicated via TEAMS and left message Mali Espinosa CM, communicated via TEAMS /left message;6/13 per Dave brown cm,Michelle GUTIERREZ via TEAMS inform

## 2023-06-12 NOTE — SWALLOW BEDSIDE ASSESSMENT ADULT - SLP PERTINENT HISTORY OF CURRENT PROBLEM
Pulmonary fibrosis: She seems to have pulm fibrosis: it was seen last time in 12/2022: there was no follow up after that. ENT consult for thrush eval: mild oral thrush already on treatment, indirect laryngoscopy reveals minimal laryngeal involvement normal vocal cords, minimal posterior arytenoid edema noted on indirect laryngoscopy, likely 2/2 GERD. continue diflucan 100mg x1week, ppi x6 weeks.

## 2023-06-12 NOTE — SWALLOW BEDSIDE ASSESSMENT ADULT - SLP GENERAL OBSERVATIONS
Pt encountered upright in bed, on room air, HHA at bedside, awake/alert, oriented to self only. Pleasant and cooperative. Wet vocal quality upon encounter, however pt able to clear with cough/reswallow.

## 2023-06-12 NOTE — PROGRESS NOTE ADULT - SUBJECTIVE AND OBJECTIVE BOX
Date of Service: 23 @ 14:11    Patient is a 88y old  Female who presents with a chief complaint of thrush (2023 08:39)      Any change in ROS: The daugher is at bedside:   pt is doing well   ; on room air:       MEDICATIONS  (STANDING):  cefTRIAXone   IVPB 1000 milliGRAM(s) IV Intermittent every 24 hours  clotrimazole Lozenge 1 Lozenge Oral five times a day  fluconAZOLE IVPB      fluconAZOLE IVPB 100 milliGRAM(s) IV Intermittent every 24 hours  lactated ringers. 1000 milliLiter(s) (50 mL/Hr) IV Continuous <Continuous>  losartan 25 milliGRAM(s) Oral daily  mercaptopurine (Non - oncologic) 50 milliGRAM(s) Oral daily  pantoprazole    Tablet 40 milliGRAM(s) Oral before breakfast  rivaroxaban 10 milliGRAM(s) Oral daily    MEDICATIONS  (PRN):  guaiFENesin Oral Liquid (Sugar-Free) 100 milliGRAM(s) Oral every 6 hours PRN Cough    Vital Signs Last 24 Hrs  T(C): 36.9 (2023 11:32), Max: 37.2 (2023 05:46)  T(F): 98.5 (2023 11:32), Max: 98.9 (2023 05:46)  HR: 84 (2023 12:06) (66 - 84)  BP: 101/66 (2023 12:06) (95/60 - 113/70)  BP(mean): --  RR: 18 (2023 12:06) (18 - 20)  SpO2: 94% (2023 12:06) (92% - 99%)    Parameters below as of 2023 12:06  Patient On (Oxygen Delivery Method): room air        I&O's Summary    2023 07:01  -  2023 07:00  --------------------------------------------------------  IN: 720 mL / OUT: 200 mL / NET: 520 mL          Physical Exam:   GENERAL: NAD, well-groomed, well-developed  HEENT: YANET/   Atraumatic, Normocephalic  ENMT: No tonsillar erythema, exudates, or enlargement; Moist mucous membranes, Good dentition, No lesions  NECK: Supple, No JVD, Normal thyroid  CHEST/LUNG: Clear to auscultaion- no wheezing  CVS: Regular rate and rhythm; No murmurs, rubs, or gallops  GI: : Soft, Nontender, Nondistended; Bowel sounds present  NERVOUS SYSTEM:  Alert & Oriented X3  EXTREMITIES:  2+ Peripheral Pulses, No clubbing, cyanosis, or edema  LYMPH: No lymphadenopathy noted  SKIN: No rashes or lesions  ENDOCRINOLOGY: No Thyromegaly  PSYCH: Appropriate    Labs:  ABG - ( 2023 06:30 )  pH, Arterial: 7.51  pH, Blood: x     /  pCO2: 41    /  pO2: 98    / HCO3: 33    / Base Excess: 8.8   /  SaO2: 99.7                                        10.8   6.17  )-----------( 235      ( 2023 05:54 )             32.3                         12.5   7.52  )-----------( 247      ( 10 Arthur 2023 21:59 )             37.3     06-12    140  |  101  |  22  ----------------------------<  85  3.1<L>   |  27  |  0.50  06-10    140  |  99  |  28<H>  ----------------------------<  117<H>  5.1   |  23  |  0.56    Ca    9.2      2023 05:53  Ca    10.1      10 Arthur 2023 21:59    TPro  8.5<H>  /  Alb  3.7  /  TBili  1.0  /  DBili  x   /  AST  38  /  ALT  13  /  AlkPhos  79  06-10    CAPILLARY BLOOD GLUCOSE          LIVER FUNCTIONS - ( 10 Arthur 2023 21:59 )  Alb: 3.7 g/dL / Pro: 8.5 g/dL / ALK PHOS: 79 U/L / ALT: 13 U/L / AST: 38 U/L / GGT: x             Urinalysis Basic - ( 2023 06:37 )    Color: Yellow / Appearance: Turbid / S.044 / pH: x  Gluc: x / Ketone: Trace  / Bili: Negative / Urobili: Negative   Blood: x / Protein: 100 mg/dL / Nitrite: Negative   Leuk Esterase: Negative / RBC: 2 /hpf / WBC 2 /HPF   Sq Epi: x / Non Sq Epi: x / Bacteria: Negative        rad< from: CT Chest No Cont (23 @ 01:58) >  peripheral reticular densities with mid to upper lobe predominance is   unchanged. No pleural effusion.    UPPER ABDOMEN: Cholelithiasis.    BONES/SOFT TISSUES: Degenerative changes. Compression deformities of T7,   and T9 are unchanged from 2022. T12 compression deformity is also   appreciated. This was not imaged on the previous exam.    IMPRESSION:  Scattered groundglass nodular and patchy opacities as well as clusters of   tree in bud type nodular opacities as described above. This is likely   related to multifocal infection for which both typical and atypical   etiologies should be considered. Pattern of tree-in-bud typelower lobe   nodules may suggest aspiration as well. Correlate clinically.    Bronchitis/bronchiolitis.    Stable ascending aortic dilatation.    Stable 4 mm right upper lobe nodule.    Mild fibrotic changes, stable.    Cardiomegaly and small pericardial effusion.    < end of copied text >      RECENT CULTURES:   @ 01:13 Clean Catch Clean Catch (Midstream)                <10,000 CFU/mL Normal Urogenital Lu    06-10 @ 21:32 .Blood Blood-Peripheral                No growth to date.    06-10 @ 20:58 .Blood Blood-Peripheral                No growth to date.          RESPIRATORY CULTURES:          Studies  Chest X-RAY  CT SCAN Chest   Venous Dopplers: LE:   CT Abdomen  Others

## 2023-06-12 NOTE — PROGRESS NOTE ADULT - ASSESSMENT
88F with PMH/PSH including HTN, Aortic Stenosis,  Vocal cord paralysis and dysphagia presents to the ED with one week of "thrush" and difficulty swallowing.   Daughter states ptn sounds " junky" when she is breathing and talking. Patient was able to spell her name and provide her .  Daughter reports ptn was Rx'ed w Fluconazole and Mercaptopurine by ENT.  Denies previous episode of thrush.  Denies immunosuppressant, history of HIV.  Reports since then has not really improved, Ptn has had minimal po intake since 23.  Reports pain with swallowing and while interview has been spitting into emesis bag.  Placed on O2 by EMS, denies use at home.  Denies chest pain, shortness of breath, abdominal pain, nausea, vomiting, diarrhea, urinary complaints, fevers, chills, recent illness.    A/P  Aspiration PNA:   Thrush: seen by ent PTA, will cont Diflucan, get ENT eval here to address dysphagia and Vocal cord paralysis  Ascending Aortic Aneurism and h/o AS:   Dysphagia: place on dysphagia diet  HTN:   DVT      :    Aspiration PNA: on zosyn: no gross consolidation: wbc is normal :;  no fever: check procal:  ? short course of antibiotics'   Pulmonary fibrosis: She seems to have pulm fibrosis:  it was seen last time in 2022: there was no follow up after that  : I am not sure if pts family is aware about it:  recent ct scan showed: pulm fibrosis again : will dw family and will need to decide about her home oxygen requirement: check abg: in am   Thrush: seen by ent PTA, will cont Diflucan, get ENT eval here to address dysphagia and Vocal cord paralysis  Ascending Aortic Aneurism and h/o AS: echo awaited  Dysphagia: place on dysphagia diet  HTN:  controlled  DVT ; on xarelto    dw acp    612:    Aspiration PNA: on ceftriaxone todauy : no gross consolidation: wbc is normal :;  no fever: check procal:not done yet:    Pulmonary fibrosis: She seems to have pulm fibrosis:  it was seen last time in 2022: there was no follow up after that  : I am not sure if pts family is aware about it:  recent ct scan showed: pulm fibrosis again : will dw family and will need to decide about her home oxygen requirement: her abg today is with metabolic alkalosis:   Thrush: seen by ent PTA, will cont Diflucan, ent eval done: follow recommendations  Ascending Aortic Aneurism and h/o AS: echo awaited :probnp is pretty  high   Dysphagia:  on dysphagia diet  HTN:  controlled  DVT ; on xarelto    dw acp  and daughter at bedside

## 2023-06-12 NOTE — PHYSICAL THERAPY INITIAL EVALUATION ADULT - ADDITIONAL COMMENTS
pt lives in apt no steps to enter has elevator ;has 24/7 HHA that stays with her to assist as needed ; pt apt has 2 levels with chairlift to 2nd level but pt does not go upstairs per pt ; pt has bedroom and BR on main level of apt , has a walk-in shower and shower chair and grab bars , pt has a transport w/c , rollator for amb with close supervision , pt can perform bed mobility independent; dtr ID caregiver as livia Tavarez present for session

## 2023-06-12 NOTE — SWALLOW BEDSIDE ASSESSMENT ADULT - H & P REVIEW
88F with PMH/PSH including HTN, Aortic Stenosis,  Vocal cord paralysis and dysphagia presents to the ED with one week of "thrush" and difficulty swallowing.   Daughter states ptn sounds " junky" when she is breathing and talking. Patient was able to spell her name and provide her .  Daughter reports ptn was Rx'ed w Fluconazole and Mercaptopurine by ENT.  Denies previous episode of thrush.  Denies immunosuppressant, history of HIV.  Reports since then has not really improved, Ptn has had minimal po intake since 23.  Reports pain with swallowing and while interview has been spitting into emesis bag.  Placed on O2 by EMS, denies use at home.  Denies chest pain, shortness of breath, abdominal pain, nausea, vomiting, diarrhea, urinary complaints, fevers, chills, recent illness. Pulmonology consulted for hypoxic resp failure: Thrush: seen by ent PTA, will cont Diflucan, get ENT eval here to address dysphagia and Vocal cord paralysis. C/f aspiration PNA - on zosyn.

## 2023-06-12 NOTE — PROGRESS NOTE ADULT - SUBJECTIVE AND OBJECTIVE BOX
CC: f/u for asp PNA    Patient reports no new c/o, asking for water    REVIEW OF SYSTEMS: no fevers, no chills, no nausea, no vomiting, no abd pain, no resp symptoms  All other review of systems negative (Comprehensive ROS)    Antimicrobials Day #    cefTRIAXone   IVPB 1000 milliGRAM(s) IV Intermittent every 24 hours  clotrimazole Lozenge 1 Lozenge Oral five times a day  fluconAZOLE IVPB      fluconAZOLE IVPB 100 milliGRAM(s) IV Intermittent every 24 hours    Other Medications Reviewed    T(F): 98.5 (23 @ 11:32), Max: 98.9 (23 @ 05:46)  HR: 84 (23 @ 12:06)  BP: 101/66 (23 @ 12:06)  RR: 18 (23 @ 12:06)  SpO2: 94% (23 @ 12:06)    PHYSICAL EXAM:  General: alert, no acute distress  Eyes:  anicteric, no conjunctival injection, no discharge  Oropharynx: no lesions or injection 	  Neck: supple, without adenopathy  Lungs: clear to auscultation  Heart: regular rate and rhythm; no murmur, rubs or gallops  Abdomen: soft, nondistended, nontender, without mass or organomegaly  Skin: no lesions  Extremities: no clubbing, cyanosis, or edema  Neurologic: alert, oriented, moves all extremities    LAB RESULTS:                        10.8   6.17  )-----------( 235      ( 2023 05:54 )             32.3     06-12    140  |  101  |  22  ----------------------------<  85  3.1<L>   |  27  |  0.50    Ca    9.2      2023 05:53    TPro  8.5<H>  /  Alb  3.7  /  TBili  1.0  /  DBili  x   /  AST  38  /  ALT  13  /  AlkPhos  79  06-10    LIVER FUNCTIONS - ( 10 Arthur 2023 21:59 )  Alb: 3.7 g/dL / Pro: 8.5 g/dL / ALK PHOS: 79 U/L / ALT: 13 U/L / AST: 38 U/L / GGT: x           Urinalysis Basic - ( 2023 06:37 )    Color: Yellow / Appearance: Turbid / S.044 / pH: x  Gluc: x / Ketone: Trace  / Bili: Negative / Urobili: Negative   Blood: x / Protein: 100 mg/dL / Nitrite: Negative   Leuk Esterase: Negative / RBC: 2 /hpf / WBC 2 /HPF   Sq Epi: x / Non Sq Epi: x / Bacteria: Negative        MICROBIOLOGY REVIEWED:    RADIOLOGY REVIEWED:    < from: CT Chest No Cont (23 @ 01:58) >  IMPRESSION:  Scattered groundglass nodular and patchy opacities as well as clusters of   tree in bud type nodular opacities as described above. This is likely   related to multifocal infection for which both typical and atypical   etiologies should be considered. Pattern of tree-in-bud typelower lobe   nodules may suggest aspiration as well. Correlate clinically.    Bronchitis/bronchiolitis.    Stable ascending aortic dilatation.    Stable 4 mm right upper lobe nodule.    Mild fibrotic changes, stable.    Cardiomegaly and small pericardial effusion.    < end of copied text >

## 2023-06-12 NOTE — PHYSICAL THERAPY INITIAL EVALUATION ADULT - GAIT DEVIATIONS NOTED, PT EVAL
decreased claudio/increased time in double stance/decreased step length/decreased stride length/decreased weight-shifting ability

## 2023-06-12 NOTE — CONSULT NOTE ADULT - ASSESSMENT
A/P  88F with PMH/PSH including HTN, Aortic Stenosis,  Vocal cord paralysis and dysphagia presents to the ED with one week of "thrush" and difficulty swallowing.         #Aortic Stenosis  -Stable, no overload on exam  -Check echo    #Aortic Aneurysm  -3.7cm, Stable on CT  -No reports of cp, back pain, sob    #Candidal Esophagitis  -Fluconazole  -Mgmt per med, GI    #Dysphagia  -CT w/ concern for aspiration pna  -W/u per med, gi, neuro  -Abx per id      Please call/text me with questions/concerns between 8am-4pm  [phone]

## 2023-06-12 NOTE — CHART NOTE - NSCHARTNOTEFT_GEN_A_CORE
Called  by  RN  that  pt  has  been  having  persistent  dry  cough  and  is  requesting   something  to  alleviate  it.    Pt  was  seen  at    the  bedside  +  Cough  note  most  likely  2/2  to  her  pneumonia.  No  apparent  distress   was  noted  .    Pt  is  hemodynamically  stable.  Denies  any  other complaints  at  this  time.      Patient is a 88y old  Female who was  addmiited  for  pneumonia,  Esophagitis 2/2  oral  thrush.       Vital Signs Last 24 Hrs  T(C): 37.1 (2023 21:18), Max: 37.1 (2023 21:18)  T(F): 98.7 (2023 21:18), Max: 98.7 (2023 21:18)  HR: 79 (:18) (66 - 84)  BP: 99/63 (:18) (95/60 - 113/70)  BP(mean): --  RR: 20 (2023 21:18) (18 - 20)  SpO2: 94% (:18) (94% - 99%)    Parameters below as of 2023 21:18  Patient On (Oxygen Delivery Method): nasal cannula  O2 Flow (L/min): 2        Labs:                          12.5   7.52  )-----------( 247      ( 10 Arthur 2023 21:59 )             37.3     06-10    140  |  99  |  28<H>  ----------------------------<  117<H>  5.1   |  23  |  0.56    Ca    10.1      10 Arthur 2023 21:59    TPro  8.5<H>  /  Alb  3.7  /  TBili  1.0  /  DBili  x   /  AST  38  /  ALT  13  /  AlkPhos  79  06-10    Constitutional: No fever, fatigue or weight loss.  Skin: No rash.  Eyes: No recent vision problems or eye pain.  ENT: No congestion, ear pain, + sore throat.  +  Oral  thrush   Endocrine: No thyroid problems.  Cardiovascular: No chest pain or palpation.  Respiratory: No cough, shortness of breath, congestion, or wheezing.  Gastrointestinal: No abdominal pain, nausea, vomiting, or diarrhea.  Genitourinary: No dysuria.  Musculoskeletal: No joint swelling.  Neurologic: No headache.          Physical Exam:  General: WN/WD NAD  Neurology: A&Ox3, nonfocal, CRUZ x 4  Head:  Normocephalic, atraumatic  Respiratory: CTA B/L  CV: RRR, S1S2, no murmur  Abdominal: Soft, NT, ND no palpable mass  MSK: No edema, + peripheral pulses, FROM all 4 extremity    Assessment & Plan:  HPI:  88F with PMH/PSH including HTN, Aortic Stenosis,  Vocal cord paralysis and dysphagia presents to the ED with one week of "thrush" and difficulty swallowing.   Daughter states ptn sounds " junky" when she is breathing and talking. Patient was able to spell her name and provide her .  Daughter reports ptn was Rx'ed w Fluconazole and Mercaptopurine by ENT.  Denies previous episode of thrush.  Denies immunosuppressant, history of HIV.  Reports since then has not really improved, Ptn has had minimal po intake since 23.  Reports pain with swallowing and while interview has been spitting into emesis bag.  Placed on O2 by EMS, denies use at home.  Denies chest pain, shortness of breath, abdominal pain, nausea, vomiting, diarrhea, urinary complaints, fevers, chills, recent illness. (2023 11:59).  Now  with  a  cough  at  this  time.  Cough  >  Guaifensin  100 mg  po  every  6  hours    >Continue  with  her  antibiotics   > S/S  Is  pending    >F/U  with  the  primary  team  in  the  am.

## 2023-06-12 NOTE — SWALLOW BEDSIDE ASSESSMENT ADULT - COMMENTS
Pulmonology consulted for hypoxic resp failure: Thrush: seen by ent PTA, will cont Diflucan, get ENT eval here to address dysphagia and Vocal cord paralysis.  - Aspiration PNA: on zosyn: no gross consolidation: wbc is normal :;  no fever: check procal:  ? short course of antibiotics'   Pulmonary fibrosis: She seems to have pulm fibrosis:  it was seen last time in 12/2022: there was no follow up after that  : I am not sure if pts family is aware about it:  recent ct scan showed: pulm fibrosis again : will dw family and will need to decide about her home oxygen requirement: check abg: in am   Cardiology consulted for Aortic Stenosis.  Neurology consulted for Dysphagia: While initial notes write vocal cord paralysis, the ent note states vocal cords were mobile.   While dysphagia can be seen with neurological etiologies, the lack of other symptoms, findings on exam, and nl CT head made them much less likely.  In this population, dysphagia is often idiopathic  Additionally neurological causes of dysphagia would not cause thrush. Therefore would not endorse further neurological evaluation at this time    -6/12: Called by RN that pt has been having persistent dry cough and is requesting something to alleviate it.    Pt was seen at the bedside +  Cough note most likely 2/2 to her pneumonia. No apparent distress was noted. Pt is hemodynamically stable. Denies  any other complaints at this time. Infectious disease consult for pneumonia, thrush: developed thrush and was given po diflucan but despite this she has ongoing dysphagia and gurgling. CT suggest aspiration and poor pulmonary toilet. Suspect she has aspiration pneumonia with partially treated thrush. Unclear if the dysphagia was caused by thrush alone or if she has some other cause.  Cardiology consulted for Aortic Stenosis.  Neurology consulted for Dysphagia: While initial notes write vocal cord paralysis, the ent note states vocal cords were mobile.   While dysphagia can be seen with neurological etiologies, the lack of other symptoms, findings on exam, and nl CT head made them much less likely.  In this population, dysphagia is often idiopathic. No further neurological evaluation at this time.  GI consult: can consider esophagram if not diagnostic    -6/12: Called by RN that pt has been having persistent dry cough and is requesting something to alleviate it.    Pt was seen at the bedside +  Cough note most likely 2/2 to her pneumonia. No apparent distress was noted. Pt is hemodynamically stable. Denies  any other complaints at this time.    CT CHEST IMPRESSION: Scattered groundglass nodular and patchy opacities as well as clusters of tree in bud type nodular opacities as described above. This is likely related to multifocal infection for which both typical and atypical etiologies should be considered. Pattern of tree-in-bud type lower lobe nodules may suggest aspiration as well. Correlate clinically. Bronchitis/bronchiolitis. Stable ascending aortic dilatation. Stable 4 mm right upper lobe nodule. Mild fibrotic changes, stable. Cardiomegaly and small pericardial effusion.    SWALLOW HISTORY: No reports in SCM or in PACS prior to this admission.

## 2023-06-12 NOTE — PHYSICAL THERAPY INITIAL EVALUATION ADULT - NSPTDMEREC_GEN_A_CORE
pt owns a rollator , shower chair , grab bars in shower , transport w/c , has 24/7 HHA to assist as needed

## 2023-06-12 NOTE — CONSULT NOTE ADULT - SUBJECTIVE AND OBJECTIVE BOX
CARDIOLOGY CONSULT - Dr. Tafoya         HPI:  88F with PMH/PSH including HTN, Aortic Stenosis,  Vocal cord paralysis and dysphagia presents to the ED with one week of "thrush" and difficulty swallowing.   Daughter states ptn sounds " junky" when she is breathing and talking. Patient was able to spell her name and provide her .  Daughter reports ptn was Rx'ed w Fluconazole and Mercaptopurine by ENT.  Denies previous episode of thrush.  Denies immunosuppressant, history of HIV.  Reports since then has not really improved, Ptn has had minimal po intake since 23.  Reports pain with swallowing and while interview has been spitting into emesis bag.  Placed on O2 by EMS, denies use at home.  Denies chest pain, shortness of breath, abdominal pain, nausea, vomiting, diarrhea, urinary complaints, fevers, chills, recent illness. (2023 11:59)      PAST MEDICAL & SURGICAL HISTORY:  Benign Hypertension (ICD9 401.1)      HLD (Hyperlipidemia) (ICD9 272.4)      Crohn's Disease of Intestine (ICD9 555.9)      Mitral Regurgitation (ICD9 424.0)      Aortic Stenosis (ICD9 424.1)      Chronic Osteoarthritis (ICD9 715.90)      Osteopenia (ICD9 733.90)      C Section (ICD9 669.70)      C Section (ICD9 669.70)      C Section (ICD9 669.70)      C Section (ICD9 669.70)              PREVIOUS DIAGNOSTIC TESTING:    [ ] Echocardiogram:  [ ]  Catheterization:  [ ] Stress Test:  	      MEDICATIONS:  Home Medications:  fluconazole 100 mg oral tablet: 2 tab(s) orally for 1 day then 1 tab once a day for 9 days (2023 10:18)  losartan 25 mg oral tablet: 1 tab(s) orally once a day (2023 10:18)  mercaptopurine 50 mg oral tablet: 1 tab(s) orally once a day (2023 10:18)  mesalamine 500 mg oral capsule, extended release: 2 cap(s) orally 4 times a day (2023 10:18)      MEDICATIONS  (STANDING):  cefTRIAXone   IVPB 1000 milliGRAM(s) IV Intermittent every 24 hours  clotrimazole Lozenge 1 Lozenge Oral five times a day  fluconAZOLE IVPB      fluconAZOLE IVPB 100 milliGRAM(s) IV Intermittent every 24 hours  lactated ringers. 1000 milliLiter(s) (50 mL/Hr) IV Continuous <Continuous>  losartan 25 milliGRAM(s) Oral daily  mercaptopurine (Non - oncologic) 50 milliGRAM(s) Oral daily  pantoprazole    Tablet 40 milliGRAM(s) Oral before breakfast  rivaroxaban 10 milliGRAM(s) Oral daily      FAMILY HISTORY:      SOCIAL HISTORY:    [x] non-smoker  [] Smoker  [ ] Alcohol    Allergies    Bacitracin ointment (Rash)  No Known Drug Allergies  latex (Rash)    Intolerances    	    REVIEW OF SYSTEMS:  CONSTITUTIONAL: No fever, weight loss, or fatigue  EYES: No eye pain, visual disturbances, or discharge  ENMT:  No difficulty hearing, tinnitus, vertigo; No sinus or throat pain  NECK: No pain or stiffness  RESPIRATORY: No cough, wheezing, chills or hemoptysis; No Shortness of Breath  CARDIOVASCULAR: No chest pain, palpitations, passing out, dizziness, or leg swelling  GASTROINTESTINAL: +Dysphagia. No abdominal or epigastric pain. No nausea, vomiting, or hematemesis; No diarrhea or constipation. No melena or hematochezia.  GENITOURINARY: No dysuria, frequency, hematuria, or incontinence  NEUROLOGICAL: No headaches, memory loss, loss of strength, numbness, or tremors  SKIN: No itching, burning, rashes, or lesions   	    [x] All others negative	  [ ] Unable to obtain    PHYSICAL EXAM:  T(C): 37.2 (23 @ 05:46), Max: 37.2 (23 @ 05:46)  HR: 77 (23 05:46) (66 - 79)  BP: 107/72 (23 @ 05:46) (95/60 - 113/70)  RR: 18 (23 @ 05:46) (18 - 20)  SpO2: 94% (23 @ 05:46) (94% - 99%)  Wt(kg): --  I&O's Summary    2023 07:01  -  2023 07:00  --------------------------------------------------------  IN: 720 mL / OUT: 200 mL / NET: 520 mL        Appearance: Elderly female	  Psychiatry: A & O x 3, Mood & affect appropriate  HEENT:   Normal oral mucosa, PERRL, EOMI	  Lymphatic: No lymphadenopathy  Cardiovascular: Normal S1 S2,RRR, No JVD, +Systolic murmur  Respiratory: Lungs clear to auscultation	  Gastrointestinal:  Soft, Non-tender, + BS	  Skin: No rashes, No ecchymoses, No cyanosis	  Neurologic: Non-focal  Extremities: Normal range of motion, No clubbing, cyanosis or edema  Vascular: Peripheral pulses palpable 2+ bilaterally    TELEMETRY: 	    ECG: NSR 85bpm 	  RADIOLOGY:  < from: CT Chest No Cont (23 @ 01:58) >  IMPRESSION:  Scattered groundglass nodular and patchy opacities as well as clusters of   tree in bud type nodular opacities as described above. This is likely   related to multifocal infection for which both typical and atypical   etiologies should be considered. Pattern of tree-in-bud typelower lobe   nodules may suggest aspiration as well. Correlate clinically.    Bronchitis/bronchiolitis.    Stable ascending aortic dilatation.    Stable 4 mm right upper lobe nodule.    Mild fibrotic changes, stable.    Cardiomegaly and small pericardial effusion.    --- End of Report ---    < end of copied text >    < from: CT Head No Cont (23 @ 16:10) >    IMPRESSION:  No CT evidence of acute intracranial pathology.  Follow-up MRI may be   obtained to exclude a small brainstem infarct as cause of dysphagia.    --- End of Report ---    < end of copied text >      OTHER: 	  	  LABS:	 	    CARDIAC MARKERS:                                  10.8   6.17  )-----------( 235      ( 2023 05:54 )             32.3     06-12    140  |  101  |  22  ----------------------------<  85  3.1<L>   |  27  |  0.50    Ca    9.2      2023 05:53    TPro  8.5<H>  /  Alb  3.7  /  TBili  1.0  /  DBili  x   /  AST  38  /  ALT  13  /  AlkPhos  79  06-10      proBNP:   Lipid Profile:   HgA1c:   TSH:       
HPI:   Patient is a 88y female who has history of IBD, Aortic stenosis , not clear if on any meds for ibd of late, developed dysphagia and saw ent who diagnosed thrush. Per daughter she was placed on diflucan,and 6mp but not really clear about the latter and may have vocal cord paralysis and suggested to f/u with gi. . Despite this she was gurgling, lots of secretions hence came to hospital. Per daughter she is off ibd meds for a long time. She is not having fever. No n,v,d,sob,cp,ha, hemoptysis, dysuria, diarrhea    REVIEW OF SYSTEMS:  All other review of systems negative (Comprehensive ROS)    PAST MEDICAL & SURGICAL HISTORY:  Benign Hypertension (ICD9 401.1)      HLD (Hyperlipidemia) (ICD9 272.4)      Crohn's Disease of Intestine (ICD9 555.9)      Mitral Regurgitation (ICD9 424.0)      Aortic Stenosis (ICD9 424.1)      Chronic Osteoarthritis (ICD9 715.90)      Osteopenia (ICD9 733.90)      C Section (ICD9 669.70)      C Section (ICD9 669.70)      C Section (ICD9 669.70)      C Section (ICD9 669.70)          Allergies    Bacitracin ointment (Rash)  No Known Drug Allergies  latex (Rash)    Intolerances        Antimicrobials Day #  :1  cefTRIAXone   IVPB 1000 milliGRAM(s) IV Intermittent every 24 hours  clotrimazole Lozenge 1 Lozenge Oral five times a day  fluconAZOLE IVPB        Other Medications:  lactated ringers. 1000 milliLiter(s) IV Continuous <Continuous>  losartan 25 milliGRAM(s) Oral daily  mercaptopurine (Non - oncologic) 50 milliGRAM(s) Oral daily  mesalamine ER Capsule 1000 milliGRAM(s) Oral four times a day  rivaroxaban 10 milliGRAM(s) Oral daily      FAMILY HISTORY:      SOCIAL HISTORY:  Smoking: [ ]Yes [ ]xNo  ETOH: [ ]Yes [ ]Nxo  Drug Use: [ ]Yes [ ]Nxo   [ ] Singlex[ ]    T(F): 97.7 (23 @ 16:00), Max: 99.2 (06-10-23 @ 21:25)  HR: 75 (23 @ 16:00)  BP: 102/65 (23 @ 16:00)  RR: 18 (23 @ 16:00)  SpO2: 97% (23 @ 16:00)  Wt(kg): --    PHYSICAL EXAM:  General: alert, no acute distress  Eyes:  anicteric, no conjunctival injection, no discharge  Oropharynx: no lesions or injection 	  Neck: supple, without adenopathy  Lungs: rhonchi to auscultation  Heart: regular rate and rhythm; no murmur, rubs or gallops  Abdomen: soft, nondistended, nontender, without mass or organomegaly  Skin: no lesions  Extremities: no clubbing, cyanosis, or edema  Neurologic: alert, oriented, moves all extremities    LAB RESULTS:                        12.5   7.52  )-----------( 247      ( 10 Arthur 2023 21:59 )             37.3     06-10    140  |  99  |  28<H>  ----------------------------<  117<H>  5.1   |  23  |  0.56    Ca    10.1      10 Arthur 2023 21:59    TPro  8.5<H>  /  Alb  3.7  /  TBili  1.0  /  DBili  x   /  AST  38  /  ALT  13  /  AlkPhos  79  06-10    LIVER FUNCTIONS - ( 10 Arthur 2023 21:59 )  Alb: 3.7 g/dL / Pro: 8.5 g/dL / ALK PHOS: 79 U/L / ALT: 13 U/L / AST: 38 U/L / GGT: x           Urinalysis Basic - ( 2023 01:13 )    Color: Yellow / Appearance: Clear / S.035 / pH: x  Gluc: x / Ketone: Moderate  / Bili: Small / Urobili: <2 mg/dL   Blood: x / Protein: 300 mg/dL / Nitrite: Negative   Leuk Esterase: Negative / RBC: 3 /hpf / WBC 3 /HPF   Sq Epi: x / Non Sq Epi: x / Bacteria: Negative        MICROBIOLOGY:  RECENT CULTURES:        RADIOLOGY REVIEWED:    < from: CT Chest No Cont (23 @ 01:58) >  ACC: [id number] EXAM:  CT CHEST   ORDERED BY: HANNA OCATS     PROCEDURE DATE:  2023          INTERPRETATION:  CLINICAL INFORMATION: Difficulty swallowing.    COMPARISON: CT chest from 2022.    CONTRAST/COMPLICATIONS:  IV Contrast: NONE  Oral Contrast: NONE  Complications: None reported at time of study completion    PROCEDURE:  CT scan of the chest was obtained without intravenous contrast.    FINDINGS:    LYMPH NODES: No lymphadenopathy.    HEART/VASCULATURE:  Heart size is enlarged.Small pericardial effusion.. Ectatic ascending   aorta measuring 3.7 cm is unchanged from 2022.  There are aortic,   valvular and coronary artery calcifications.    AIRWAYS/LUNGS/PLEURA: Layering secretions within the trachea. Thickening   of the bronchial walls. Scattered bilateral groundglass nodular patchy   opacities, left greater than right. Additional clusters of solid nodules   are seen within the bilateral lower lobes with tree-in-bud distribution,   likely related to small airways disease. Separate right upper lobe nodule   measures 0.4 cm (3:193) is unchanged from prior. Additional bilateral   peripheral reticular densities with mid to upper lobe predominance is   unchanged. No pleural effusion.    UPPER ABDOMEN: Cholelithiasis.    BONES/SOFT TISSUES: Degenerative changes. Compression deformities of T7,   and T9 are unchanged from 2022. T12 compression deformity is also   appreciated. This was not imaged on the previous exam.    IMPRESSION:  Scattered groundglass nodular and patchy opacities as well as clusters of   tree in bud type nodular opacities as described above. This is likely   related to multifocal infection for which both typical and atypical   etiologies should be considered. Pattern of tree-in-bud typelower lobe   nodules may suggest aspiration as well. Correlate clinically.    Bronchitis/bronchiolitis.    Stable ascending aortic dilatation.    Stable 4 mm right upper lobe nodule.    Mild fibrotic changes, stable.    Cardiomegaly and small pericardial effusion.    --- End of Report ---           NAMAN STAFFORD MD; Resident Radiologist    < end of copied text >        Impression: Elderly woman with ibd, has developed thrush and was given po diflucan but despite this she has ongoing dysphagia and gurgling. CT suggest aspiration and poor pulmonary toilet. ENT here shows some mild thrush but vocal cords work ok and no other pathology. She is not with fever, not clear if she has active ibd and per daughter not on meds but daughter reports the ent prescribed 6mp??? Suspect she has aspiration pneumonia with partially treated thruch. Unclear if the dysphagia was caused by thrush alone or if she has some other cause. No recent hospital stays so dont feel she needs very broad spectrum abx  Recommendations:  on iv diflucan for now since has dysphagia  cover pneumonia with ceftriaxone  gi consult, speech and swallow eval, consider FEES  pulmo follows  will do sputum cx and mrsa swab
Admitting Diagnosis:  Other lesion of oral mucosa [K13.79]  OTHER LESIONS OF ORAL MUCOSA        HPI:  This is a 88y year old Female with the below past medical history who presents with the chief complaint of dyspahgia.  including HTN, Aortic Stenosis,  Vocal cord paralysis (this is in the note from admission) and dysphagia presents to the ED with one week of "thrush" and difficulty swallowing.   Daughter states ptn sounds " junky" when she is breathing and talking. Patient was able to spell her name and provide her .  Daughter reports ptn was Rx'ed w Fluconazole and Mercaptopurine by ENT.  Denies previous episode of thrush.  Denies immunosuppressant, history of HIV.  Reports since then has not really improved, Ptn has had minimal po intake since 23.  Reports pain with swallowing and while interview has been spitting into emesis bag.    Pt seen by ENT while in house  their note is as follows:      Fiberoptic Indirect laryngoscopy:  (Scope #2 used)Reason for Laryngoscopy:    Patient was unable to cooperate with mirror.  Nasopharynx, oropharynx, and hypopharynx clear, no bleeding. Tongue base, posterior pharyngeal wall, vallecula, epiglottis, and subglottis appear normal. No erythema, edema, pooling of secretions, masses or lesions. Airway patent, no foreign body visualized. No glottic/supraglottic edema. True vocal cords, arytenoids, vestibular folds, ventricles, pyriform sinuses, and aryepiglottic folds appear normal bilaterally. Vocal cords mobile with good contact b/l. minimal thrush noted throughout     Pt denies any issues with speech, vision such as diplopia, new focal weakness/numbness, changes in bowel or bladder control.  She denies any hx of neurological disease      Past Medical History:  Benign Hypertension (ICD9 401.1) [401.1]    HLD (Hyperlipidemia) (ICD9 272.4) [272.4]    Crohn's Disease of Intestine (ICD9 555.9) [555.9]    Mitral Regurgitation (ICD9 424.0) [424.0]    Aortic Stenosis (ICD9 424.1) [424.1]    Chronic Osteoarthritis (ICD9 715.90) [715.90]    Osteopenia (ICD9 733.90) [733.90]        Past Surgical History:  C Section (ICD9 669.70) [669.70]    C Section (ICD9 669.70) [669.70]    C Section (ICD9 669.70) [669.70]    C Section (ICD9 669.70) [669.70]        Social History:  No toxic habits    Family History:  FAMILY HISTORY:      Allergies:  Bacitracin ointment (Rash)  No Known Drug Allergies  latex (Rash)      ROS:  Constitutional: Patient offers no complaints of fevers or significant weight loss  Ears, Nose, Mouth and Throat: The patient presents with no abnormalities of the head, ears, eyes, nose or throat  Skin: Patient offers no concerns of new rashes or lesions  Respiratory: The patient presents with no abnormalities of the respiratory tract  Cardiovascular: The patient presents with no cardiac abnormalities  Gastrointestinal: The patient presents with no abnormalities of the GI system  Genitourinary: The patient presents with no dysuria, hematuria or frequent urination  Neurological: See HPI  Endocrine: Patient offers no complaints of excessive thirst, urination, or heat/cold intolerance    Advanced care planning reviewed and noted in the chart.    Medications:  cefTRIAXone   IVPB 1000 milliGRAM(s) IV Intermittent every 24 hours  clotrimazole Lozenge 1 Lozenge Oral five times a day  fluconAZOLE IVPB      fluconAZOLE IVPB 100 milliGRAM(s) IV Intermittent every 24 hours  guaiFENesin Oral Liquid (Sugar-Free) 100 milliGRAM(s) Oral every 6 hours PRN  lactated ringers. 1000 milliLiter(s) IV Continuous <Continuous>  losartan 25 milliGRAM(s) Oral daily  mercaptopurine (Non - oncologic) 50 milliGRAM(s) Oral daily  pantoprazole    Tablet 40 milliGRAM(s) Oral before breakfast  rivaroxaban 10 milliGRAM(s) Oral daily      Labs:  CBC Full  -  ( 2023 05:54 )  WBC Count : 6.17 K/uL  RBC Count : 3.43 M/uL  Hemoglobin : 10.8 g/dL  Hematocrit : 32.3 %  Platelet Count - Automated : 235 K/uL  Mean Cell Volume : 94.2 fl  Mean Cell Hemoglobin : 31.5 pg  Mean Cell Hemoglobin Concentration : 33.4 gm/dL  Auto Neutrophil # : x  Auto Lymphocyte # : x  Auto Monocyte # : x  Auto Eosinophil # : x  Auto Basophil # : x  Auto Neutrophil % : x  Auto Lymphocyte % : x  Auto Monocyte % : x  Auto Eosinophil % : x  Auto Basophil % : x    06-12    140  |  101  |  22  ----------------------------<  85  3.1<L>   |  27  |  0.50    Ca    9.2      2023 05:53    TPro  8.5<H>  /  Alb  3.7  /  TBili  1.0  /  DBili  x   /  AST  38  /  ALT  13  /  AlkPhos  79  06-10    CAPILLARY BLOOD GLUCOSE        LIVER FUNCTIONS - ( 10 Arthur 2023 21:59 )  Alb: 3.7 g/dL / Pro: 8.5 g/dL / ALK PHOS: 79 U/L / ALT: 13 U/L / AST: 38 U/L / GGT: x             Urinalysis Basic - ( 2023 06:37 )    Color: Yellow / Appearance: Turbid / S.044 / pH: x  Gluc: x / Ketone: Trace  / Bili: Negative / Urobili: Negative   Blood: x / Protein: 100 mg/dL / Nitrite: Negative   Leuk Esterase: Negative / RBC: 2 /hpf / WBC 2 /HPF   Sq Epi: x / Non Sq Epi: x / Bacteria: Negative      Female    Vitals:  Vital Signs Last 24 Hrs  T(C): 37.2 (2023 05:46), Max: 37.2 (2023 05:46)  T(F): 98.9 (2023 05:46), Max: 98.9 (2023 05:46)  HR: 77 (2023 05:46) (66 - 79)  BP: 107/72 (2023 05:46) (95/60 - 113/70)  BP(mean): --  RR: 18 (2023 05:46) (18 - 20)  SpO2: 94% (2023 05:46) (94% - 99%)    Parameters below as of 2023 05:46  Patient On (Oxygen Delivery Method): mask, nonrebreather        NEUROLOGICAL EXAM:    Mental status: Awake, alert, and in no apparent distress. Oriented to person, place and time. Language function is normal. Recent memory, digit span and concentration were normal.     Cranial Nerves: Pupils were equal, round, reactive to light. Extraocular movements were intact. Visual field were full. Fundoscopic exam was deferred. Facial sensation was intact to light touch. There was no facial asymmetry. The palate was upgoing symmetrically and tongue was midline. Hearing acuity was intact to finger rub AU. Shoulder shrug was full bilaterally    Motor exam: Bulk and tone were normal. Strength was 5/5 in all four extremities. Fine finger movements were symmetric and normal. There was no pronator drift    Reflexes: 2+ in the bilateral upper extremities. 2+ in the bilateral lower extremities. Toes were downgoing bilaterally.     Sensation: Intact to light touch, temperature, vibration and proprioception.     Coordination: Finger-nose-finger and heel-to-shin was without dysmetria.     Gait: defer    < from: CT Head No Cont (23 @ 16:10) >    ACC: [id number] EXAM:  CT BRAIN   ORDERED BY: CINDY D REYES     PROCEDURE DATE:  2023          INTERPRETATION:  CLINICAL INFORMATION: Dysphagia.  Aspiration pneumonia.    Rule out cerebral vascular accident    TECHNIQUE:  Noncontrast axial CT images were acquired through the head.  Sagittal and coronal reformats were performed.    COMPARISON STUDY: MRI brain from 2016.  CT maxillofacial from   2009.    FINDINGS:  There is no CT evidence of acute intracranial hemorrhage, mass effect or   midline shift.  Gray matter-white matter differentiation is grossly   preserved.    There is mild generalized cerebral volume loss.  Minimal periventricular   white matter hypoattenuation is not seen in etiology but likely reflects   chronic microvascular ischemic disease in this age group.    There are intracranial atherosclerotic calcifications in the intradural   vertebral arteries bilaterally (left greater than right), at the   vertebrobasilar junction/proximal basilar artery, and in the internal   carotid arteries bilaterally.  Again seen is mild tortuosity of the   vertebrobasilar system.  The distal vertebral arteries and   vertebrobasilar junction abut the undersurface of the prem.      The paranasal sinuses and mastoids are grossly clear.    The patient is status post intraocular lens replacement bilaterally.    The calvarium and skull base appear within normal limits.    IMPRESSION:  No CT evidence of acute intracranial pathology.  Follow-up MRI may be   obtained to exclude a small brainstem infarct as cause of dysphagia.    --- End of Report ---            JEANETTE AGUIRRE MD; Attending Radiologist  This document has been electronically signed. 2023  5:05PM    < end of copied text >    
  23 @ 12:28    Patient is a 88y old  Female who presents with a chief complaint of     HPI:  88F with PMH/PSH including HTN, Aortic Stenosis,  Vocal cord paralysis and dysphagia presents to the ED with one week of "thrush" and difficulty swallowing.   Daughter states ptn sounds " junky" when she is breathing and talking. Patient was able to spell her name and provide her .  Daughter reports ptn was Rx'ed w Fluconazole and Mercaptopurine by ENT.  Denies previous episode of thrush.  Denies immunosuppressant, history of HIV.  Reports since then has not really improved, Ptn has had minimal po intake since 23.  Reports pain with swallowing and while interview has been spitting into emesis bag.  Placed on O2 by EMS, denies use at home.  Denies chest pain, shortness of breath, abdominal pain, nausea, vomiting, diarrhea, urinary complaints, fevers, chills, recent illness. (2023 11:59)     she is on 2 L of oxygen : her son is at bedside:    she says she came in because:  she was recently treated for thrush also   by ent:   she has ulcerative colitis:     ?FOLLOWING PRESENT  [ x] Hx of PE/DVT, [x ] Hx COPD, [ x] Hx of Asthma, [ y] Hx of Hospitalization, [ x]  Hx of BiPAP/CPAP use, x[ ] Hx of KATE    Allergies    Bacitracin ointment (Rash)  No Known Drug Allergies  latex (Rash)    Intolerances        PAST MEDICAL & SURGICAL HISTORY:  Benign Hypertension (ICD9 401.1)      HLD (Hyperlipidemia) (ICD9 272.4)      Crohn's Disease of Intestine (ICD9 555.9)      Mitral Regurgitation (ICD9 424.0)      Aortic Stenosis (ICD9 424.1)      Chronic Osteoarthritis (ICD9 715.90)      Osteopenia (ICD9 733.90)      C Section (ICD9 669.70)      C Section (ICD9 669.70)      C Section (ICD9 669.70)      C Section (ICD9 669.70)          FAMILY HISTORY:      Social History: [ former smoker:  quit 40 yrs ago  ] TOBACCO                  [ x ] ETOH                                 [ x ] IVDA/DRUGS    REVIEW OF SYSTEMS      General:	x    Skin/Breast:x  	  Ophthalmologic:x  	  ENMT:	x    Respiratory and Thorax:  sob,  difficulty swallowing  	  Cardiovascular:	x    Gastrointestinal:	x    Genitourinary:	x    Musculoskeletal:	x    Neurological:	x    Psychiatric:	x    Hematology/Lymphatics:	x    Endocrine:	x    Allergic/Immunologic:	x    MEDICATIONS  (STANDING):  clotrimazole Lozenge 1 Lozenge Oral five times a day  fluconAZOLE IVPB      fluconAZOLE IVPB 100 milliGRAM(s) IV Intermittent once  lactated ringers. 1000 milliLiter(s) (50 mL/Hr) IV Continuous <Continuous>  losartan 25 milliGRAM(s) Oral daily  mercaptopurine (Non - oncologic) 50 milliGRAM(s) Oral daily  mesalamine ER Capsule 1000 milliGRAM(s) Oral four times a day  piperacillin/tazobactam IVPB. 3.375 Gram(s) IV Intermittent once  piperacillin/tazobactam IVPB.- 3.375 Gram(s) IV Intermittent once  piperacillin/tazobactam IVPB.. 3.375 Gram(s) IV Intermittent every 8 hours  rivaroxaban 10 milliGRAM(s) Oral daily    MEDICATIONS  (PRN):       Vital Signs Last 24 Hrs  T(C): 36.9 (2023 04:27), Max: 37.3 (10 Arthur 2023 21:25)  T(F): 98.4 (2023 04:27), Max: 99.2 (10 Arthur 2023 21:25)  HR: 84 (2023 04:27) (84 - 90)  BP: 104/70 (2023 04:27) (104/70 - 134/80)  BP(mean): 83 (2023 03:57) (83 - 96)  RR: 20 (2023 04:27) (20 - 33)  SpO2: 95% (2023 04:27) (90% - 100%)    Parameters below as of 2023 04:27  Patient On (Oxygen Delivery Method): nasal cannula  O2 Flow (L/min): 2  Orthostatic VS          I&O's Summary      Physical Exam:   GENERAL: NAD, well-groomed, well-developed  HEENT: YANET/   Atraumatic, Normocephalic  ENMT: No tonsillar erythema, exudates, or enlargement; Moist mucous membranes, Good dentition, No lesions  NECK: Supple, No JVD, Normal thyroid  CHEST/LUNG: Clear to auscultation bilaterally- no wheezing:  some scattered craceksl+  CVS: Regular rate and rhythm; No murmurs, rubs, or gallops  GI: : Soft, Nontender, Nondistended; Bowel sounds present  NERVOUS SYSTEM:  Alert & Oriented X3  EXTREMITIES: - edema  LYMPH: No lymphadenopathy noted  SKIN: No rashes or lesions  ENDOCRINOLOGY: No Thyromegaly  PSYCH:calm   Labs:                              12.5   7.52  )-----------( 247      ( 10 Arthur 2023 21:59 )             37.3     06-10    140  |  99  |  28<H>  ----------------------------<  117<H>  5.1   |  23  |  0.56    Ca    10.1      10 Arthur 2023 21:59    TPro  8.5<H>  /  Alb  3.7  /  TBili  1.0  /  DBili  x   /  AST  38  /  ALT  13  /  AlkPhos  79  06-10    CAPILLARY BLOOD GLUCOSE        LIVER FUNCTIONS - ( 10 Arthur 2023 21:59 )  Alb: 3.7 g/dL / Pro: 8.5 g/dL / ALK PHOS: 79 U/L / ALT: 13 U/L / AST: 38 U/L / GGT: x             Urinalysis Basic - ( 2023 01:13 )    Color: Yellow / Appearance: Clear / S.035 / pH: x  Gluc: x / Ketone: Moderate  / Bili: Small / Urobili: <2 mg/dL   Blood: x / Protein: 300 mg/dL / Nitrite: Negative   Leuk Esterase: Negative / RBC: 3 /hpf / WBC 3 /HPF   Sq Epi: x / Non Sq Epi: x / Bacteria: Negative      D DImer      Studies  Chest X-RAY  CT SCAN Chest   CT Abdomen  Venous Dopplers: LE:   Others          rad< from: CT Chest No Cont (23 @ 01:58) >  BONES/SOFT TISSUES: Degenerative changes. Compression deformities of T7,   and T9 are unchanged from 2022. T12 compression deformity is also   appreciated. This was not imaged on the previous exam.    IMPRESSION:  Scattered groundglass nodular and patchy opacities as well as clusters of   tree in bud type nodular opacities as described above. This is likely   related to multifocal infection for which both typical and atypical   etiologies should be considered. Pattern of tree-in-bud typelower lobe   nodules may suggest aspiration as well. Correlate clinically.    Bronchitis/bronchiolitis.    Stable ascending aortic dilatation.    Stable 4 mm right upper lobe nodule.    Mild fibrotic changes, stable.    Cardiomegaly and small pericardial effusion.    < end of copied text >  < from: CT Chest w/ IV Cont (22 @ 15:02) >  noted in the right upper lobe. No pleural effusions are noted.    Below the diaphragm, visualized portions of the abdomen demonstrate   cholelithiasis. Very small low-attenuation lesions in the liver and right   kidney are too small to be adequately characterized on this exam.    Degenerative changes of the spine as well as loss of height of one of the   midthoracic vertebral bodies is noted.    IMPRESSION: Reticular opacities are noted in the peripheral aspect of   both lungs as described above. The finding suggests pulmonary fibrosis of   uncertain etiology.    --- End of Report ---               PAUL CORBETT MD; Attending Radiologist   This document has been electronically signed. Dec 12 2022  1:28PM    < end of copied text >          
CC: thrush/dysphagia     HPI:  88F with PMH/PSH including HTN, Aortic Stenosis,  Vocal cord paralysis and dysphagia presents to the ED with one week of "thrush" and difficulty swallowing.   Daughter states ptn sounds " junky" when she is breathing and talking. Patient was able to spell her name and provide her .  Daughter reports ptn was Rx'ed w Fluconazole and Mercaptopurine by ENT.  Denies previous episode of thrush.  Denies immunosuppressant, history of HIV.  Reports since then has not really improved, Ptn has had minimal po intake since 23.  Reports pain with swallowing and while interview has been spitting into emesis bag.  Placed on O2 by EMS, denies use at home.  Denies chest pain, shortness of breath, abdominal pain, nausea, vomiting, diarrhea, urinary complaints, fevers, chills, recent illness, dysphonia, sob, dyspnea.       PAST MEDICAL & SURGICAL HISTORY:  Benign Hypertension (ICD9 401.1)      HLD (Hyperlipidemia) (ICD9 272.4)      Crohn's Disease of Intestine (ICD9 555.9)      Mitral Regurgitation (ICD9 424.0)      Aortic Stenosis (ICD9 424.1)      Chronic Osteoarthritis (ICD9 715.90)      Osteopenia (ICD9 733.90)      C Section (ICD9 669.70)      C Section (ICD9 669.70)      C Section (ICD9 669.70)      C Section (ICD9 669.70)        Allergies    Bacitracin ointment (Rash)  No Known Drug Allergies  latex (Rash)    Intolerances      MEDICATIONS  (STANDING):  clotrimazole Lozenge 1 Lozenge Oral five times a day  fluconAZOLE IVPB      lactated ringers. 1000 milliLiter(s) (50 mL/Hr) IV Continuous <Continuous>  losartan 25 milliGRAM(s) Oral daily  mercaptopurine (Non - oncologic) 50 milliGRAM(s) Oral daily  mesalamine ER Capsule 1000 milliGRAM(s) Oral four times a day  piperacillin/tazobactam IVPB.- 3.375 Gram(s) IV Intermittent once  piperacillin/tazobactam IVPB.. 3.375 Gram(s) IV Intermittent every 8 hours  rivaroxaban 10 milliGRAM(s) Oral daily    MEDICATIONS  (PRN):      Social History: no tobacco, no etoh     Family history: Pt denies any sign FHx    ROS:   ENT: all negative except as noted in HPI   CV: denies palpitations  Pulm: denies SOB, cough, hemoptysis  GI: denies change in apetite, indigestion, n/v  : denies pertinent urinary symptoms, urgency  Neuro: denies numbness/tingling, loss of sensation  Psych: denies anxiety  MS: denies muscle weakness, instability  Heme: denies easy bruising or bleeding  Endo: denies heat/cold intolerance, excessive sweating  Vascular: denies LE edema    Vital Signs Last 24 Hrs  T(C): 36.5 (2023 16:00), Max: 37.3 (10 Arthur 2023 21:25)  T(F): 97.7 (2023 16:00), Max: 99.2 (10 Arthur 2023 21:25)  HR: 75 (2023 16:00) (75 - 90)  BP: 102/65 (2023 16:00) (100/67 - 134/80)  BP(mean): 83 (2023 03:57) (83 - 96)  RR: 18 (2023 16:00) (18 - 33)  SpO2: 97% (2023 16:00) (90% - 100%)    Parameters below as of 2023 16:00  Patient On (Oxygen Delivery Method): nasal cannula  O2 Flow (L/min): 2                            12.5   7.52  )-----------( 247      ( 10 Arthur 2023 21:59 )             37.3    06-10    140  |  99  |  28<H>  ----------------------------<  117<H>  5.1   |  23  |  0.56    Ca    10.1      10 Arthur 2023 21:59    TPro  8.5<H>  /  Alb  3.7  /  TBili  1.0  /  DBili  x   /  AST  38  /  ALT  13  /  AlkPhos  79  06-10       PHYSICAL EXAM:  Gen: NAD  Skin: No rashes, bruises, or lesions  Head: Normocephalic, Atraumatic  Face: no edema, erythema, or fluctuance. Parotid glands soft without mass  Eyes: no scleral injection  Nose: Nares bilaterally patent, no discharge  Mouth: No Stridor / Drooling / Trismus.  Mucosa moist, tongue/uvula midline, oropharynx with notable thrush   Neck: Flat, supple, no lymphadenopathy, trachea midline, no masses  Lymphatic: No lymphadenopathy  Resp: breathing easily, no stridor  CV: no peripheral edema/cyanosis  GI: nondistended   Peripheral vascular: no JVD or edema  Neuro: facial nerve intact, no facial droop      Fiberoptic Indirect laryngoscopy:  (Scope #2 used)Reason for Laryngoscopy:    Patient was unable to cooperate with mirror.  Nasopharynx, oropharynx, and hypopharynx clear, no bleeding. Tongue base, posterior pharyngeal wall, vallecula, epiglottis, and subglottis appear normal. No erythema, edema, pooling of secretions, masses or lesions. Airway patent, no foreign body visualized. No glottic/supraglottic edema. True vocal cords, arytenoids, vestibular folds, ventricles, pyriform sinuses, and aryepiglottic folds appear normal bilaterally. Vocal cords mobile with good contact b/l. minimal thrush noted throughout               IMAGING/ADDITIONAL STUDIES:

## 2023-06-12 NOTE — PHYSICAL THERAPY INITIAL EVALUATION ADULT - NSPTDISCHREC_GEN_A_CORE
with assist all functional activity and adl's pt and dtr and pvt HHA understood ; HOme PT to increase fxl mobility , strength, balance, endurance , fall prevention education continued/Home PT

## 2023-06-12 NOTE — PHYSICAL THERAPY INITIAL EVALUATION ADULT - PRECAUTIONS/LIMITATIONS, REHAB EVAL
aspiration precautions/fall precautions see above in general observations for pulse ox values/aspiration precautions/fall precautions/oxygen therapy device and L/min

## 2023-06-13 LAB
ANION GAP SERPL CALC-SCNC: 10 MMOL/L — SIGNIFICANT CHANGE UP (ref 5–17)
BUN SERPL-MCNC: 16 MG/DL — SIGNIFICANT CHANGE UP (ref 7–23)
CALCIUM SERPL-MCNC: 9 MG/DL — SIGNIFICANT CHANGE UP (ref 8.4–10.5)
CHLORIDE SERPL-SCNC: 101 MMOL/L — SIGNIFICANT CHANGE UP (ref 96–108)
CO2 SERPL-SCNC: 28 MMOL/L — SIGNIFICANT CHANGE UP (ref 22–31)
CREAT SERPL-MCNC: 0.48 MG/DL — LOW (ref 0.5–1.3)
EGFR: 91 ML/MIN/1.73M2 — SIGNIFICANT CHANGE UP
GLUCOSE SERPL-MCNC: 100 MG/DL — HIGH (ref 70–99)
HCT VFR BLD CALC: 33.3 % — LOW (ref 34.5–45)
HGB BLD-MCNC: 11.4 G/DL — LOW (ref 11.5–15.5)
MCHC RBC-ENTMCNC: 31.9 PG — SIGNIFICANT CHANGE UP (ref 27–34)
MCHC RBC-ENTMCNC: 34.2 GM/DL — SIGNIFICANT CHANGE UP (ref 32–36)
MCV RBC AUTO: 93.3 FL — SIGNIFICANT CHANGE UP (ref 80–100)
NRBC # BLD: 0 /100 WBCS — SIGNIFICANT CHANGE UP (ref 0–0)
PLATELET # BLD AUTO: 246 K/UL — SIGNIFICANT CHANGE UP (ref 150–400)
POTASSIUM SERPL-MCNC: 3 MMOL/L — LOW (ref 3.5–5.3)
POTASSIUM SERPL-SCNC: 3 MMOL/L — LOW (ref 3.5–5.3)
PROCALCITONIN SERPL-MCNC: 0.11 NG/ML — HIGH (ref 0.02–0.1)
RBC # BLD: 3.57 M/UL — LOW (ref 3.8–5.2)
RBC # FLD: 13.2 % — SIGNIFICANT CHANGE UP (ref 10.3–14.5)
SODIUM SERPL-SCNC: 139 MMOL/L — SIGNIFICANT CHANGE UP (ref 135–145)
WBC # BLD: 5.2 K/UL — SIGNIFICANT CHANGE UP (ref 3.8–10.5)
WBC # FLD AUTO: 5.2 K/UL — SIGNIFICANT CHANGE UP (ref 3.8–10.5)

## 2023-06-13 PROCEDURE — 74230 X-RAY XM SWLNG FUNCJ C+: CPT | Mod: 26

## 2023-06-13 RX ORDER — POTASSIUM CHLORIDE 20 MEQ
40 PACKET (EA) ORAL EVERY 4 HOURS
Refills: 0 | Status: DISCONTINUED | OUTPATIENT
Start: 2023-06-13 | End: 2023-06-13

## 2023-06-13 RX ORDER — POTASSIUM CHLORIDE 20 MEQ
40 PACKET (EA) ORAL EVERY 4 HOURS
Refills: 0 | Status: COMPLETED | OUTPATIENT
Start: 2023-06-13 | End: 2023-06-13

## 2023-06-13 RX ADMIN — MERCAPTOPURINE 50 MILLIGRAM(S): 50 TABLET ORAL at 12:31

## 2023-06-13 RX ADMIN — RIVAROXABAN 10 MILLIGRAM(S): KIT at 12:31

## 2023-06-13 RX ADMIN — Medication 100 MILLIGRAM(S): at 21:18

## 2023-06-13 RX ADMIN — Medication 1 LOZENGE: at 12:32

## 2023-06-13 RX ADMIN — Medication 40 MILLIEQUIVALENT(S): at 14:37

## 2023-06-13 RX ADMIN — Medication 40 MILLIEQUIVALENT(S): at 17:57

## 2023-06-13 RX ADMIN — CEFTRIAXONE 100 MILLIGRAM(S): 500 INJECTION, POWDER, FOR SOLUTION INTRAMUSCULAR; INTRAVENOUS at 16:15

## 2023-06-13 RX ADMIN — Medication 1 LOZENGE: at 20:45

## 2023-06-13 RX ADMIN — FLUCONAZOLE 50 MILLIGRAM(S): 150 TABLET ORAL at 12:32

## 2023-06-13 RX ADMIN — Medication 40 MILLIEQUIVALENT(S): at 21:18

## 2023-06-13 NOTE — SWALLOW VFSS/MBS ASSESSMENT ADULT - NS SWALLOW VFSS REC ASPIR MON
Monitor for s/s aspiration/laryngeal penetration. If noted:  D/C p.o. intake, provide non-oral nutrition/hydration/meds, and contact this service @ [phone]/change of breathing pattern/cough/gurgly voice/fever/pneumonia/throat clearing/upper respiratory infection

## 2023-06-13 NOTE — SWALLOW VFSS/MBS ASSESSMENT ADULT - DIAGNOSTIC IMPRESSIONS
89 yo F admitted with dysphagia and thrush; CT (-), MRI pending. Per neuro, possibly ideopathic. Patient presents on MBS with an oropharyngeal dysphagia. Prolonged yet functional oral bolus prep, optimized on soft bite sized food textures. Premature spillage, delayed pharyngeal trigger, reduced supra and subglottic sensation result in SILENT ASPIRATION of mildly thick liquids. Cued cough is not effective in clearing aspirated material. Swallow strategies are not effective in improving airway protection for this consistency. Trace silent laryngeal penetration is noted on purees and moderately thick liquids however airway protection is maintained with use of teaspoon and chin tuck position. There is no evidence of laryngeal penetration or aspiration on remaining consistencies trialed.

## 2023-06-13 NOTE — SWALLOW VFSS/MBS ASSESSMENT ADULT - ORAL PHASE COMMENTS
moderate spill to pyriforms timing and effectiveness of chin tuck optimized with use of teaspoon min oral residue cleared on repeat swallow mildly prolonged yet fuctional oral bolus prep; spill of liquid consistency to a-e folds prior to pharyngeal trigger

## 2023-06-13 NOTE — PROGRESS NOTE ADULT - ASSESSMENT
1) appreicate all services  2) speech and swallow mbs appropriate  3) rx candida per id  4) ppi bid for gerd  5) would try to avoid peg  6) abx for aspiration

## 2023-06-13 NOTE — SWALLOW VFSS/MBS ASSESSMENT ADULT - H & P REVIEW
88F with PMH/PSH including HTN, Aortic Stenosis,  Vocal cord paralysis and dysphagia presents to the ED with one week of "thrush" and difficulty swallowing.   Daughter states ptn sounds " junky" when she is breathing and talking. Patient was able to spell her name and provide her .  Daughter reports ptn was Rx'ed w Fluconazole and Mercaptopurine by ENT.  Denies previous episode of thrush.  Denies immunosuppressant, history of HIV.  Reports since then has not really improved, Ptn has had minimal po intake since 23.  Reports pain with swallowing and while interview has been spitting into emesis bag.  Placed on O2 by EMS, denies use at home.  Denies chest pain, shortness of breath, abdominal pain, nausea, vomiting, diarrhea, urinary complaints, fevers, chills, recent illness. Pulmonology consulted for hypoxic resp failure: Thrush: seen by ent PTA, will cont Diflucan, get ENT eval here to address dysphagia and Vocal cord paralysis. C/f aspiration PNA - on zosyn/yes

## 2023-06-13 NOTE — PROGRESS NOTE ADULT - ASSESSMENT
88F with PMH/PSH including HTN, Aortic Stenosis,  Vocal cord paralysis and dysphagia presents to the ED with one week of "thrush" and difficulty swallowing.   Daughter states ptn sounds " junky" when she is breathing and talking. Patient was able to spell her name and provide her .  Daughter reports ptn was Rx'ed w Fluconazole and Mercaptopurine by ENT.  Denies previous episode of thrush.  Denies immunosuppressant, history of HIV.  Reports since then has not really improved, Ptn has had minimal po intake since 23.  Reports pain with swallowing and while interview has been spitting into emesis bag.  Placed on O2 by EMS, denies use at home.  Denies chest pain, shortness of breath, abdominal pain, nausea, vomiting, diarrhea, urinary complaints, fevers, chills, recent illness.    A/P  Aspiration PNA: seen by ID and pulm.   on dysphagia diet , seen by S7S, place NPO while awaiting MBS,   Thrush: seen by ent PTA, will cont Diflucan x 1 week, ENT eval here DONE: no vocal cord paralysis was observed.   Ascending Aortic Aneurism   h/o AS: get rpt TTE, she hasnt had one in a while  Volume depetion resolved post IVF, presently euvolemic  Dysphagia: cont on dysphagia diet  HTN: cont LOSARTAN  DVT ppx w po xarelto

## 2023-06-13 NOTE — SWALLOW VFSS/MBS ASSESSMENT ADULT - RECOMMENDED FEEDING/EATING TECHNIQUES
allow for swallow between intakes/crush medication (when feasible)/maintain upright posture during/after eating for 30 mins/no straws/oral hygiene/position upright (90 degrees)/provide rest periods between swallows/small sips/bites/tuck chin

## 2023-06-13 NOTE — PROGRESS NOTE ADULT - ASSESSMENT
87 yo woman with ibd, has developed thrush and was given po diflucan but despite this she has ongoing dysphagia and gurgling. CT suggest aspiration and poor pulmonary toilet. ENT here shows some mild thrush but vocal cords work ok and no other pathology.  Suspect she has aspiration pneumonia with partially treated thrush.   Unclear if the dysphagia was caused by thrush alone or if she has some other cause.   No recent hospitalizations  Recommendations:  on iv diflucan for now while dysphagia w/u in progress, can switch to po diflucan  cover pneumonia with ceftriaxone for now day 4/7, can switch to PO ceftin  f/u sputum cultures  family updated at bedside

## 2023-06-13 NOTE — PROGRESS NOTE ADULT - ASSESSMENT
88F with PMH/PSH including HTN, Aortic Stenosis,  Vocal cord paralysis and dysphagia presents to the ED with one week of "thrush" and difficulty swallowing.   Daughter states ptn sounds " junky" when she is breathing and talking. Patient was able to spell her name and provide her .  Daughter reports ptn was Rx'ed w Fluconazole and Mercaptopurine by ENT.  Denies previous episode of thrush.  Denies immunosuppressant, history of HIV.  Reports since then has not really improved, Ptn has had minimal po intake since 23.  Reports pain with swallowing and while interview has been spitting into emesis bag.  Placed on O2 by EMS, denies use at home.  Denies chest pain, shortness of breath, abdominal pain, nausea, vomiting, diarrhea, urinary complaints, fevers, chills, recent illness.    A/P  Aspiration PNA:   Thrush: seen by ent PTA, will cont Diflucan, get ENT eval here to address dysphagia and Vocal cord paralysis  Ascending Aortic Aneurism and h/o AS:   Dysphagia: place on dysphagia diet  HTN:   DVT      :    Aspiration PNA: on zosyn: no gross consolidation: wbc is normal :;  no fever: check procal:  ? short course of antibiotics'   Pulmonary fibrosis: She seems to have pulm fibrosis:  it was seen last time in 2022: there was no follow up after that  : I am not sure if pts family is aware about it:  recent ct scan showed: pulm fibrosis again : will dw family and will need to decide about her home oxygen requirement: check abg: in am   Thrush: seen by ent PTA, will cont Diflucan, get ENT eval here to address dysphagia and Vocal cord paralysis  Ascending Aortic Aneurism and h/o AS: echo awaited  Dysphagia: place on dysphagia diet  HTN:  controlled  DVT ; on xarelto    dw acp    612:    Aspiration PNA: on ceftriaxone todauy : no gross consolidation: wbc is normal :;  no fever: check procal:not done yet:    Pulmonary fibrosis: She seems to have pulm fibrosis:  it was seen last time in 2022: there was no follow up after that  : I am not sure if pts family is aware about it:  recent ct scan showed: pulm fibrosis again : will dw family and will need to decide about her home oxygen requirement: her abg today is with metabolic alkalosis:   Thrush: seen by ent PTA, will cont Diflucan, ent eval done: follow recommendations  Ascending Aortic Aneurism and h/o AS: echo awaited :probnp is pretty  high   Dysphagia:  on dysphagia diet  HTN:  controlled  DVT ; on xarelto    dw acp  and daughter at bedside    :    Aspiration PNA: on ceftriaxone day  : no gross consolidation: wbc is normal :; ordered procal  Pulmonary fibrosis: She seems to have pulm fibrosis:  it was seen last time in 2022: there was no follow up after that  : I am not sure if pts family is aware about it:  recent ct scan showed: pulm fibrosis again : will dw family and will need to decide about her home oxygen requirement: her abg today is with metabolic alkalosis:   Thrush: seen by ent PTA, will cont Diflucan, ent eval done: follow recommendations  Pulmonary nodule: Stable 4 mm right upper lobe nodule. can be followed up as anoutpt  Ascending Aortic Aneurism and h/o AS: echo awaited :probnp is pretty  high   Dysphagia:  on dysphagia diet  HTN:  controlled  DVT ; on xarelto    dw acp  and son and dil at bedside

## 2023-06-13 NOTE — PROGRESS NOTE ADULT - SUBJECTIVE AND OBJECTIVE BOX
Patient is a 88y Female     Patient is a 88y old  Female who presents with a chief complaint of thrush (2023 08:39)      HPI:  88F with PMH/PSH including HTN, Aortic Stenosis,  Vocal cord paralysis and dysphagia presents to the ED with one week of "thrush" and difficulty swallowing.   Daughter states ptn sounds " junky" when she is breathing and talking. Patient was able to spell her name and provide her .  Daughter reports ptn was Rx'ed w Fluconazole and Mercaptopurine by ENT.  Denies previous episode of thrush.  Denies immunosuppressant, history of HIV.  Reports since then has not really improved, Ptn has had minimal po intake since 23.  Reports pain with swallowing and while interview has been spitting into emesis bag.  Placed on O2 by EMS, denies use at home.  Denies chest pain, shortness of breath, abdominal pain, nausea, vomiting, diarrhea, urinary complaints, fevers, chills, recent illness. (2023 11:59)      PAST MEDICAL & SURGICAL HISTORY:  Benign Hypertension (ICD9 401.1)      HLD (Hyperlipidemia) (ICD9 272.4)      Crohn's Disease of Intestine (ICD9 555.9)      Mitral Regurgitation (ICD9 424.0)      Aortic Stenosis (ICD9 424.1)      Chronic Osteoarthritis (ICD9 715.90)      Osteopenia (ICD9 733.90)      C Section (ICD9 669.70)      C Section (ICD9 669.70)      C Section (ICD9 669.70)      C Section (ICD9 669.70)          MEDICATIONS  (STANDING):  cefTRIAXone   IVPB 1000 milliGRAM(s) IV Intermittent every 24 hours  clotrimazole Lozenge 1 Lozenge Oral five times a day  fluconAZOLE IVPB      fluconAZOLE IVPB 100 milliGRAM(s) IV Intermittent every 24 hours  lactated ringers. 1000 milliLiter(s) (50 mL/Hr) IV Continuous <Continuous>  losartan 25 milliGRAM(s) Oral daily  mercaptopurine (Non - oncologic) 50 milliGRAM(s) Oral daily  pantoprazole    Tablet 40 milliGRAM(s) Oral before breakfast  rivaroxaban 10 milliGRAM(s) Oral daily      Allergies    Bacitracin ointment (Rash)  No Known Drug Allergies  latex (Rash)    Intolerances        SOCIAL HISTORY:  Denies ETOh,Smoking,     FAMILY HISTORY:      REVIEW OF SYSTEMS:    CONSTITUTIONAL: No weakness, fevers or chills  EYES/ENT: No visual changes;  No vertigo or throat pain   NECK: No pain or stiffness  RESPIRATORY: No cough, wheezing, hemoptysis; No shortness of breath  CARDIOVASCULAR: No chest pain or palpitations  GASTROINTESTINAL: No abdominal or epigastric pain. No nausea, vomiting, or hematemesis; No diarrhea or constipation. No melena or hematochezia.  GENITOURINARY: No dysuria, frequency or hematuria  NEUROLOGICAL: No numbness or weakness  SKIN: No itching, burning, rashes, or lesions   All other review of systems is negative unless indicated above.    VITAL:  T(C): , Max: 37.1 (23 @ 04:10)  T(F): , Max: 98.8 (23 @ 04:10)  HR: 79 (23 @ 04:10)  BP: 121/81 (23 @ 04:10)  BP(mean): --  RR: 18 (23 @ 04:10)  SpO2: 93% (23 @ 04:10)  Wt(kg): --    I and O's:     @ 07:  -   @ 07:00  --------------------------------------------------------  IN: 720 mL / OUT: 200 mL / NET: 520 mL     @ 07:  -   @ 07:00  --------------------------------------------------------  IN: 0 mL / OUT: 400 mL / NET: -400 mL          PHYSICAL EXAM:    Constitutional: NAD  HEENT: PERRLA,   Neck: No JVD  Respiratory: CTA B/L  Cardiovascular: S1 and S2  Gastrointestinal: BS+, soft, NT/ND  Extremities: No peripheral edema  Neurological: A/O x 3, no focal deficits  Psychiatric: Normal mood, normal affect  : No Bazzi  Skin: No rashes  Access: Not applicable  Back: No CVA tenderness    LABS:                        10.8   6.17  )-----------( 235      ( 2023 05:54 )             32.3     06-12    140  |  101  |  22  ----------------------------<  85  3.1<L>   |  27  |  0.50    Ca    9.2      2023 05:53            RADIOLOGY & ADDITIONAL STUDIES:

## 2023-06-13 NOTE — PROGRESS NOTE ADULT - SUBJECTIVE AND OBJECTIVE BOX
CC: f/u for asp PNA    Patient reports no new c/o,    REVIEW OF SYSTEMS: no fevers, no chills, no nausea, no vomiting, no abd pain, no resp symptoms  All other review of systems negative (Comprehensive ROS)    Antimicrobials Day #    cefTRIAXone   IVPB 1000 milliGRAM(s) IV Intermittent every 24 hours  clotrimazole Lozenge 1 Lozenge Oral five times a day  fluconAZOLE IVPB      fluconAZOLE IVPB 100 milliGRAM(s) IV Intermittent every 24 hours      Other Medications Reviewed    Vital Signs Last 24 Hrs  T(C): 35.7 (2023 11:03), Max: 37.1 (2023 04:10)  T(F): 96.3 (2023 11:03), Max: 98.8 (2023 04:10)  HR: 76 (2023 11:03) (75 - 79)  BP: 101/67 (2023 11:03) (101/67 - 121/81)  BP(mean): --  RR: 18 (2023 11:03) (18 - 20)  SpO2: 98% (2023 11:03) (93% - 98%)    Parameters below as of 2023 11:03  Patient On (Oxygen Delivery Method): nasal cannula  O2 Flow (L/min): 2      PHYSICAL EXAM:  General: alert, no acute distress  Eyes:  anicteric, no conjunctival injection, no discharge  Oropharynx: no lesions or injection 	  Neck: supple, without adenopathy  Lungs: clear to auscultation  Heart: regular rate and rhythm; no murmur, rubs or gallops  Abdomen: soft, nondistended, nontender, without mass or organomegaly  Skin: no lesions  Extremities: no clubbing, cyanosis, or edema  Neurologic: alert, oriented, moves all extremities    LAB RESULTS:                                   11.4   5.20  )-----------( 246      ( 2023 07:13 )             33.3   06-13    139  |  101  |  16  ----------------------------<  100<H>  3.0<L>   |  28  |  0.48<L>    Ca    9.0      2023 07:13        Urinalysis Basic - ( 2023 06:37 )    Color: Yellow / Appearance: Turbid / S.044 / pH: x  Gluc: x / Ketone: Trace  / Bili: Negative / Urobili: Negative   Blood: x / Protein: 100 mg/dL / Nitrite: Negative   Leuk Esterase: Negative / RBC: 2 /hpf / WBC 2 /HPF   Sq Epi: x / Non Sq Epi: x / Bacteria: Negative        MICROBIOLOGY REVIEWED:  Culture - Sputum . (23 @ 10:56)   Gram Stain:   Numerous polymorphonuclear leukocytes per low power field   Numerous Squamous epithelial cells per low power field   Few Gram Positive Rods seen per oil power field  Specimen Source: .Sputum Sputum  RADIOLOGY REVIEWED:    < from: CT Chest No Cont (23 @ 01:58) >  IMPRESSION:  Scattered groundglass nodular and patchy opacities as well as clusters of   tree in bud type nodular opacities as described above. This is likely   related to multifocal infection for which both typical and atypical   etiologies should be considered. Pattern of tree-in-bud typelower lobe   nodules may suggest aspiration as well. Correlate clinically.    Bronchitis/bronchiolitis.    Stable ascending aortic dilatation.    Stable 4 mm right upper lobe nodule.    Mild fibrotic changes, stable.    Cardiomegaly and small pericardial effusion.    < end of copied text >

## 2023-06-13 NOTE — SWALLOW VFSS/MBS ASSESSMENT ADULT - COMMENTS
Infectious disease consult for pneumonia, thrush: developed thrush and was given po diflucan but despite this she has ongoing dysphagia and gurgling. CT suggest aspiration and poor pulmonary toilet. Suspect she has aspiration pneumonia with partially treated thrush. Unclear if the dysphagia was caused by thrush alone or if she has some other cause.  Cardiology consulted for Aortic Stenosis.  Neurology consulted for Dysphagia: While initial notes write vocal cord paralysis, the ent note states vocal cords were mobile.   While dysphagia can be seen with neurological etiologies, the lack of other symptoms, findings on exam, and nl CT head made them much less likely.  In this population, dysphagia is often idiopathic. No further neurological evaluation at this time.  GI consult: can consider esophagram if not diagnostic    -6/12: Called by RN that pt has been having persistent dry cough and is requesting something to alleviate it.    Pt was seen at the bedside +  Cough note most likely 2/2 to her pneumonia. No apparent distress was noted. Pt is hemodynamically stable. Denies  any other complaints at this time.    CT CHEST IMPRESSION: Scattered groundglass nodular and patchy opacities as well as clusters of tree in bud type nodular opacities as described above. This is likely related to multifocal infection for which both typical and atypical etiologies should be considered. Pattern of tree-in-bud type lower lobe nodules may suggest aspiration as well. Correlate clinically. Bronchitis/bronchiolitis. Stable ascending aortic dilatation. Stable 4 mm right upper lobe nodule. Mild fibrotic changes, stable. Cardiomegaly and small pericardial effusion.  ENT 6/11: normal vocal cords, minimal posterior arytenoid edema, likely 2/2 GERD; mild oral thrush->cont diflucan; PPI

## 2023-06-13 NOTE — SWALLOW VFSS/MBS ASSESSMENT ADULT - DELAYED INITIATION OF THE PHARYNGEAL SWALLOW (IN SECONDS)
triggered at level of pyriforms triggered at the level of a-e folds triggered at a-e folds triggered at level of valleculae

## 2023-06-13 NOTE — SWALLOW VFSS/MBS ASSESSMENT ADULT - UNSUCCESSFUL STRATEGIES TRIALED DURING PROCEDURE
do not improve airway protection/chin tuck/hard swallow/nonproductive volitional cough following clinician cue

## 2023-06-13 NOTE — SWALLOW VFSS/MBS ASSESSMENT ADULT - SLP PERTINENT HISTORY OF CURRENT PROBLEM
Pulmonary fibrosis: She seems to have pulm fibrosis: it was seen last time in 12/2022: there was no follow up after that.

## 2023-06-13 NOTE — PROGRESS NOTE ADULT - SUBJECTIVE AND OBJECTIVE BOX
Patient is a 88y old  Female who presents with a chief complaint of dysphagia (2023 07:01)      SUBJECTIVE / OVERNIGHT EVENTS: ptn is stable, awaiting MBS,     MEDICATIONS  (STANDING):  cefTRIAXone   IVPB 1000 milliGRAM(s) IV Intermittent every 24 hours  clotrimazole Lozenge 1 Lozenge Oral five times a day  fluconAZOLE IVPB      fluconAZOLE IVPB 100 milliGRAM(s) IV Intermittent every 24 hours  lactated ringers. 1000 milliLiter(s) (50 mL/Hr) IV Continuous <Continuous>  losartan 25 milliGRAM(s) Oral daily  mercaptopurine (Non - oncologic) 50 milliGRAM(s) Oral daily  pantoprazole    Tablet 40 milliGRAM(s) Oral before breakfast  potassium chloride   Powder 40 milliEquivalent(s) Oral every 4 hours  rivaroxaban 10 milliGRAM(s) Oral daily    MEDICATIONS  (PRN):  guaiFENesin Oral Liquid (Sugar-Free) 100 milliGRAM(s) Oral every 6 hours PRN Cough      Vital Signs Last 24 Hrs  T(F): 96.3 (23 @ 11:03), Max: 98.8 (23 @ 04:10)  HR: 76 (23 @ 11:03) (75 - 79)  BP: 101/67 (23 @ 11:03) (101/67 - 121/81)  RR: 18 (23 @ 11:03) (18 - 20)  SpO2: 98% (23 @ 11:03) (93% - 98%)  Telemetry:   CAPILLARY BLOOD GLUCOSE        I&O's Summary    2023 07:01  -  2023 07:00  --------------------------------------------------------  IN: 0 mL / OUT: 400 mL / NET: -400 mL        PHYSICAL EXAM:  GENERAL: NAD, well-developed  HEAD:  Atraumatic, Normocephalic  EYES: EOMI, PERRLA, conjunctiva and sclera clear  NECK: Supple, No JVD  CHEST/LUNG: Clear to auscultation bilaterally; No wheeze  HEART: Regular rate and rhythm; No murmurs, rubs, or gallops  ABDOMEN: Soft, Nontender, Nondistended; Bowel sounds present  EXTREMITIES:  2+ Peripheral Pulses, No clubbing, cyanosis, or edema  PSYCH: AAOx3  NEUROLOGY: non-focal  SKIN: No rashes or lesions    LABS:                        11.4   5.20  )-----------( 246      ( 2023 07:13 )             33.3     06-13    139  |  101  |  16  ----------------------------<  100<H>  3.0<L>   |  28  |  0.48<L>    Ca    9.0      2023 07:13            Urinalysis Basic - ( 2023 06:37 )    Color: Yellow / Appearance: Turbid / S.044 / pH: x  Gluc: x / Ketone: Trace  / Bili: Negative / Urobili: Negative   Blood: x / Protein: 100 mg/dL / Nitrite: Negative   Leuk Esterase: Negative / RBC: 2 /hpf / WBC 2 /HPF   Sq Epi: x / Non Sq Epi: x / Bacteria: Negative        RADIOLOGY & ADDITIONAL TESTS:    Imaging Personally Reviewed:    Consultant(s) Notes Reviewed:      Care Discussed with Consultants/Other Providers:

## 2023-06-13 NOTE — SWALLOW VFSS/MBS ASSESSMENT ADULT - ADDITIONAL RECOMMENDATIONS
GOAL:   1)Patient will obtain safe and adequate nutrition, hydration, medications via least restrictive diet consistency without aspiration.   2)Patient/caregiver will demonstrate understanding and effective carryover of recommended modified textures and swallow strategies.

## 2023-06-13 NOTE — SWALLOW VFSS/MBS ASSESSMENT ADULT - LARYNGEAL PENETRATION DURING THE SWALLOW - SILENT
over laryngeal surface of the epiglottis and over the arytenoids; incomplete retrieval/Moderate over the laryngeal surface of the epiglottis; retrieved on repeat swallow/Trace

## 2023-06-13 NOTE — SWALLOW VFSS/MBS ASSESSMENT ADULT - THE ABOVE FINDINGS WERE DISCUSSED WITH
Private aidkeny Tao; ACP Cat/Nursing/Patient Private aide Aislinn; patient's son; ACP Cat; RN Olena/Nursing/Patient

## 2023-06-13 NOTE — PROGRESS NOTE ADULT - SUBJECTIVE AND OBJECTIVE BOX
CARDIOLOGY FOLLOW UP - Dr. Tafoya  DATE OF SERVICE: 6/13/23    CC  No CV complaints  Still endorsing dysphagia    REVIEW OF SYSTEMS:  CONSTITUTIONAL: No fever, weight loss, or fatigue  RESPIRATORY: No cough, wheezing, chills or hemoptysis; No Shortness of Breath  CARDIOVASCULAR: No chest pain, palpitations, passing out, dizziness, or leg swelling  GASTROINTESTINAL: No abdominal or epigastric pain. No nausea, vomiting, or hematemesis; No diarrhea or constipation. No melena or hematochezia.  VASCULAR: No edema     PHYSICAL EXAM:  T(C): 37.1 (06-13-23 @ 04:10), Max: 37.1 (06-13-23 @ 04:10)  HR: 79 (06-13-23 @ 04:10) (75 - 84)  BP: 121/81 (06-13-23 @ 04:10) (96/64 - 121/81)  RR: 18 (06-13-23 @ 04:10) (18 - 20)  SpO2: 93% (06-13-23 @ 04:10) (92% - 94%)  Wt(kg): --  I&O's Summary    12 Jun 2023 07:01  -  13 Jun 2023 07:00  --------------------------------------------------------  IN: 0 mL / OUT: 400 mL / NET: -400 mL        Appearance: Elderly female	  Cardiovascular: Normal S1 S2,RRR, No JVD, =+Systolic murmur  Respiratory: Lungs clear to auscultation	  Gastrointestinal:  Soft, Non-tender, + BS	  Extremities: Normal range of motion, No clubbing, cyanosis or edema      Home Medications:  fluconazole 100 mg oral tablet: 2 tab(s) orally for 1 day then 1 tab once a day for 9 days (11 Jun 2023 10:18)  losartan 25 mg oral tablet: 1 tab(s) orally once a day (11 Jun 2023 10:18)  mercaptopurine 50 mg oral tablet: 1 tab(s) orally once a day (11 Jun 2023 10:18)  mesalamine 500 mg oral capsule, extended release: 2 cap(s) orally 4 times a day (11 Jun 2023 10:18)      MEDICATIONS  (STANDING):  cefTRIAXone   IVPB 1000 milliGRAM(s) IV Intermittent every 24 hours  clotrimazole Lozenge 1 Lozenge Oral five times a day  fluconAZOLE IVPB      fluconAZOLE IVPB 100 milliGRAM(s) IV Intermittent every 24 hours  lactated ringers. 1000 milliLiter(s) (50 mL/Hr) IV Continuous <Continuous>  losartan 25 milliGRAM(s) Oral daily  mercaptopurine (Non - oncologic) 50 milliGRAM(s) Oral daily  pantoprazole    Tablet 40 milliGRAM(s) Oral before breakfast  potassium chloride    Tablet ER 40 milliEquivalent(s) Oral every 4 hours  rivaroxaban 10 milliGRAM(s) Oral daily      TELEMETRY: 	    ECG:  	  RADIOLOGY:   DIAGNOSTIC TESTING:  [ ] Echocardiogram:    [ ]  Catheterization:  [ ] Stress Test:    OTHER: 	    LABS:	 	                            11.4   5.20  )-----------( 246      ( 13 Jun 2023 07:13 )             33.3     06-13    139  |  101  |  16  ----------------------------<  100<H>  3.0<L>   |  28  |  0.48<L>    Ca    9.0      13 Jun 2023 07:13

## 2023-06-13 NOTE — PROGRESS NOTE ADULT - ASSESSMENT
A/P  88F with PMH/PSH including HTN, Aortic Stenosis,  Vocal cord paralysis and dysphagia presents to the ED with one week of "thrush" and difficulty swallowing.         #Aortic Stenosis  -Stable, no overload on exam  -f/u echo    #Aortic Aneurysm  -3.7cm, Stable on CT  -No reports of cp, back pain, sob    #Candidal Esophagitis  -Fluconazole per id  -Mgmt per med, GI    #Dysphagia  -CT w/ concern for aspiration pna  -W/u per med, gi, neuro  -Abx per id      Please call/text me with questions/concerns between 8am-4pm  [phone]

## 2023-06-13 NOTE — PROGRESS NOTE ADULT - SUBJECTIVE AND OBJECTIVE BOX
Date of Service: 23 @ 14:42    Patient is a 88y old  Female who presents with a chief complaint of dysphagia (2023 07:01)      Any change in ROS: seems OK: no sob:  no cough : no phlegm :       MEDICATIONS  (STANDING):  cefTRIAXone   IVPB 1000 milliGRAM(s) IV Intermittent every 24 hours  clotrimazole Lozenge 1 Lozenge Oral five times a day  fluconAZOLE IVPB      fluconAZOLE IVPB 100 milliGRAM(s) IV Intermittent every 24 hours  lactated ringers. 1000 milliLiter(s) (50 mL/Hr) IV Continuous <Continuous>  losartan 25 milliGRAM(s) Oral daily  mercaptopurine (Non - oncologic) 50 milliGRAM(s) Oral daily  pantoprazole    Tablet 40 milliGRAM(s) Oral before breakfast  potassium chloride   Powder 40 milliEquivalent(s) Oral every 4 hours  rivaroxaban 10 milliGRAM(s) Oral daily    MEDICATIONS  (PRN):  guaiFENesin Oral Liquid (Sugar-Free) 100 milliGRAM(s) Oral every 6 hours PRN Cough    Vital Signs Last 24 Hrs  T(C): 35.7 (2023 11:03), Max: 37.1 (2023 04:10)  T(F): 96.3 (2023 11:03), Max: 98.8 (2023 04:10)  HR: 76 (2023 11:03) (75 - 79)  BP: 101/67 (2023 11:03) (101/67 - 121/81)  BP(mean): --  RR: 18 (2023 11:03) (18 - 20)  SpO2: 98% (2023 11:03) (93% - 98%)    Parameters below as of 2023 11:03  Patient On (Oxygen Delivery Method): nasal cannula  O2 Flow (L/min): 2      I&O's Summary    2023 07:01  -  2023 07:00  --------------------------------------------------------  IN: 0 mL / OUT: 400 mL / NET: -400 mL          Physical Exam:   GENERAL: NAD, well-groomed, well-developed  HEENT: YANET/   Atraumatic, Normocephalic  ENMT: No tonsillar erythema, exudates, or enlargement; Moist mucous membranes, Good dentition, No lesions  NECK: Supple, No JVD, Normal thyroid  CHEST/LUNG: Clear to auscultaion  CVS: Regular rate and rhythm; No murmurs, rubs, or gallops  GI: : Soft, Nontender, Nondistended; Bowel sounds present  NERVOUS SYSTEM:  Alert & Oriented X3  EXTREMITIES:  - edema  LYMPH: No lymphadenopathy noted  SKIN: No rashes or lesions  ENDOCRINOLOGY: No Thyromegaly  PSYCH: Appropriate    Labs:  ABG - ( 2023 06:30 )  pH, Arterial: 7.51  pH, Blood: x     /  pCO2: 41    /  pO2: 98    / HCO3: 33    / Base Excess: 8.8   /  SaO2: 99.7                                        11.4   5.20  )-----------( 246      ( 2023 07:13 )             33.3                         10.8   6.17  )-----------( 235      ( 2023 05:54 )             32.3                         12.5   7.52  )-----------( 247      ( 10 Arthur 2023 21:59 )             37.3     06-13    139  |  101  |  16  ----------------------------<  100<H>  3.0<L>   |  28  |  0.48<L>      140  |  101  |  22  ----------------------------<  85  3.1<L>   |  27  |  0.50  10    140  |  99  |  28<H>  ----------------------------<  117<H>  5.1   |  23  |  0.56    Ca    9.0      2023 07:13  Ca    9.2      2023 05:53    TPro  8.5<H>  /  Alb  3.7  /  TBili  1.0  /  DBili  x   /  AST  38  /  ALT  13  /  AlkPhos  79  -10    CAPILLARY BLOOD GLUCOSE              Urinalysis Basic - ( 2023 06:37 )    Color: Yellow / Appearance: Turbid / S.044 / pH: x  Gluc: x / Ketone: Trace  / Bili: Negative / Urobili: Negative   Blood: x / Protein: 100 mg/dL / Nitrite: Negative   Leuk Esterase: Negative / RBC: 2 /hpf / WBC 2 /HPF   Sq Epi: x / Non Sq Epi: x / Bacteria: Negative            RECENT CULTURES:   @ 10:56 .Sputum Sputum       Numerous polymorphonuclear leukocytes per low power field  Numerous Squamous epithelial cells per low power field  Few Gram Positive Rods seen per oil power field       rad< from: CT Chest No Cont (23 @ 01:58) >  valvular and coronary artery calcifications.    AIRWAYS/LUNGS/PLEURA: Layering secretions within the trachea. Thickening   of the bronchial walls. Scattered bilateral groundglass nodular patchy   opacities, left greater than right. Additional clusters of solid nodules   are seen within the bilateral lower lobes with tree-in-bud distribution,   likely related to small airways disease. Separate right upper lobe nodule   measures 0.4 cm (3:193) is unchanged from prior. Additional bilateral   peripheral reticular densities with mid to upper lobe predominance is   unchanged. No pleural effusion.    UPPER ABDOMEN: Cholelithiasis.    BONES/SOFT TISSUES: Degenerative changes. Compression deformities of T7,   and T9 are unchanged from 2022. T12 compression deformity is also   appreciated. This was not imaged on the previous exam.    IMPRESSION:  Scattered groundglass nodular and patchy opacities as well as clusters of   tree in bud type nodular opacities as described above. This is likely   related to multifocal infection for which both typical and atypical   etiologies should be considered. Pattern of tree-in-bud typelower lobe   nodules may suggest aspiration as well. Correlate clinically.    Bronchitis/bronchiolitis.    Stable ascending aortic dilatation.    Stable 4 mm right upper lobe nodule.    Mild fibrotic changes, stable.    Cardiomegaly and small pericardial effusion.    < end of copied text >         @ 01:13 Clean Catch Clean Catch (Midstream)                <10,000 CFU/mL Normal Urogenital Lu    06-10 @ 21:32 .Blood Blood-Peripheral                No growth to date.    06-10 @ 20:58 .Blood Blood-Peripheral                No growth to date.          RESPIRATORY CULTURES:          Studies  Chest X-RAY  CT SCAN Chest   Venous Dopplers: LE:   CT Abdomen  Others

## 2023-06-13 NOTE — SWALLOW VFSS/MBS ASSESSMENT ADULT - SLP GENERAL OBSERVATIONS
Pt received in radiology secured in ROSA chair. 2L O2 NC. mild wet vocal quality at baseline. NAD. Private aide Aislinn present.

## 2023-06-13 NOTE — SWALLOW VFSS/MBS ASSESSMENT ADULT - CONSISTENCIES ADMINISTERED
mildly thick pureed/minced & moist/soft & bite-sized moderately thick mixed consistency-soft bite sized/moderately thick

## 2023-06-14 LAB
ANION GAP SERPL CALC-SCNC: 9 MMOL/L — SIGNIFICANT CHANGE UP (ref 5–17)
BUN SERPL-MCNC: 10 MG/DL — SIGNIFICANT CHANGE UP (ref 7–23)
CALCIUM SERPL-MCNC: 9.1 MG/DL — SIGNIFICANT CHANGE UP (ref 8.4–10.5)
CHLORIDE SERPL-SCNC: 103 MMOL/L — SIGNIFICANT CHANGE UP (ref 96–108)
CO2 SERPL-SCNC: 28 MMOL/L — SIGNIFICANT CHANGE UP (ref 22–31)
CREAT SERPL-MCNC: 0.49 MG/DL — LOW (ref 0.5–1.3)
CULTURE RESULTS: SIGNIFICANT CHANGE UP
EGFR: 91 ML/MIN/1.73M2 — SIGNIFICANT CHANGE UP
GLUCOSE SERPL-MCNC: 112 MG/DL — HIGH (ref 70–99)
POTASSIUM SERPL-MCNC: 3.9 MMOL/L — SIGNIFICANT CHANGE UP (ref 3.5–5.3)
POTASSIUM SERPL-SCNC: 3.9 MMOL/L — SIGNIFICANT CHANGE UP (ref 3.5–5.3)
SODIUM SERPL-SCNC: 140 MMOL/L — SIGNIFICANT CHANGE UP (ref 135–145)
SPECIMEN SOURCE: SIGNIFICANT CHANGE UP

## 2023-06-14 PROCEDURE — 93306 TTE W/DOPPLER COMPLETE: CPT | Mod: 26

## 2023-06-14 RX ORDER — HYDRALAZINE HCL 50 MG
10 TABLET ORAL THREE TIMES A DAY
Refills: 0 | Status: DISCONTINUED | OUTPATIENT
Start: 2023-06-15 | End: 2023-06-16

## 2023-06-14 RX ADMIN — RIVAROXABAN 10 MILLIGRAM(S): KIT at 11:33

## 2023-06-14 RX ADMIN — LOSARTAN POTASSIUM 25 MILLIGRAM(S): 100 TABLET, FILM COATED ORAL at 05:43

## 2023-06-14 RX ADMIN — Medication 1 LOZENGE: at 08:33

## 2023-06-14 RX ADMIN — Medication 1 LOZENGE: at 15:34

## 2023-06-14 RX ADMIN — MERCAPTOPURINE 50 MILLIGRAM(S): 50 TABLET ORAL at 11:33

## 2023-06-14 RX ADMIN — CEFTRIAXONE 100 MILLIGRAM(S): 500 INJECTION, POWDER, FOR SOLUTION INTRAMUSCULAR; INTRAVENOUS at 15:34

## 2023-06-14 RX ADMIN — Medication 1 LOZENGE: at 11:33

## 2023-06-14 RX ADMIN — FLUCONAZOLE 50 MILLIGRAM(S): 150 TABLET ORAL at 11:33

## 2023-06-14 RX ADMIN — PANTOPRAZOLE SODIUM 40 MILLIGRAM(S): 20 TABLET, DELAYED RELEASE ORAL at 05:43

## 2023-06-14 RX ADMIN — Medication 1 LOZENGE: at 20:24

## 2023-06-14 NOTE — PROGRESS NOTE ADULT - SUBJECTIVE AND OBJECTIVE BOX
Patient is a 88y old  Female who presents with a chief complaint of aspiration (14 Jun 2023 06:45)      SUBJECTIVE / OVERNIGHT EVENTS:    MEDICATIONS  (STANDING):  cefTRIAXone   IVPB 1000 milliGRAM(s) IV Intermittent every 24 hours  clotrimazole Lozenge 1 Lozenge Oral five times a day  fluconAZOLE IVPB      fluconAZOLE IVPB 100 milliGRAM(s) IV Intermittent every 24 hours  lactated ringers. 1000 milliLiter(s) (50 mL/Hr) IV Continuous <Continuous>  losartan 25 milliGRAM(s) Oral daily  mercaptopurine (Non - oncologic) 50 milliGRAM(s) Oral daily  pantoprazole    Tablet 40 milliGRAM(s) Oral before breakfast  rivaroxaban 10 milliGRAM(s) Oral daily    MEDICATIONS  (PRN):  guaiFENesin Oral Liquid (Sugar-Free) 100 milliGRAM(s) Oral every 6 hours PRN Cough      Vital Signs Last 24 Hrs  T(F): 97.8 (06-14-23 @ 04:56), Max: 98.9 (06-13-23 @ 21:00)  HR: 92 (06-14-23 @ 04:56) (90 - 92)  BP: 130/85 (06-14-23 @ 04:56) (130/85 - 139/84)  RR: 18 (06-14-23 @ 04:56) (18 - 18)  SpO2: 92% (06-14-23 @ 04:56) (92% - 93%)  Telemetry:   CAPILLARY BLOOD GLUCOSE        I&O's Summary    13 Jun 2023 07:01  -  14 Jun 2023 07:00  --------------------------------------------------------  IN: 0 mL / OUT: 100 mL / NET: -100 mL    14 Jun 2023 07:01  -  14 Jun 2023 16:23  --------------------------------------------------------  IN: 240 mL / OUT: 0 mL / NET: 240 mL        PHYSICAL EXAM:  GENERAL: NAD, well-developed  HEAD:  Atraumatic, Normocephalic  EYES: EOMI, PERRLA, conjunctiva and sclera clear  NECK: Supple, No JVD  CHEST/LUNG: Clear to auscultation bilaterally; No wheeze  HEART: Regular rate and rhythm; No murmurs, rubs, or gallops  ABDOMEN: Soft, Nontender, Nondistended; Bowel sounds present  EXTREMITIES:  2+ Peripheral Pulses, No clubbing, cyanosis, or edema  PSYCH: AAOx3  NEUROLOGY: non-focal  SKIN: No rashes or lesions    LABS:                        11.4   5.20  )-----------( 246      ( 13 Jun 2023 07:13 )             33.3     06-14    140  |  103  |  10  ----------------------------<  112<H>  3.9   |  28  |  0.49<L>    Ca    9.1      14 Jun 2023 06:32                RADIOLOGY & ADDITIONAL TESTS:    Imaging Personally Reviewed:    Consultant(s) Notes Reviewed:      Care Discussed with Consultants/Other Providers:   Patient is a 88y old  Female who presents with a chief complaint of aspiration (14 Jun 2023 06:45)      SUBJECTIVE / OVERNIGHT EVENTS: no new events    MEDICATIONS  (STANDING):  cefTRIAXone   IVPB 1000 milliGRAM(s) IV Intermittent every 24 hours  clotrimazole Lozenge 1 Lozenge Oral five times a day  fluconAZOLE IVPB      fluconAZOLE IVPB 100 milliGRAM(s) IV Intermittent every 24 hours  lactated ringers. 1000 milliLiter(s) (50 mL/Hr) IV Continuous <Continuous>  losartan 25 milliGRAM(s) Oral daily  mercaptopurine (Non - oncologic) 50 milliGRAM(s) Oral daily  pantoprazole    Tablet 40 milliGRAM(s) Oral before breakfast  rivaroxaban 10 milliGRAM(s) Oral daily    MEDICATIONS  (PRN):  guaiFENesin Oral Liquid (Sugar-Free) 100 milliGRAM(s) Oral every 6 hours PRN Cough      Vital Signs Last 24 Hrs  T(F): 97.8 (06-14-23 @ 04:56), Max: 98.9 (06-13-23 @ 21:00)  HR: 92 (06-14-23 @ 04:56) (90 - 92)  BP: 130/85 (06-14-23 @ 04:56) (130/85 - 139/84)  RR: 18 (06-14-23 @ 04:56) (18 - 18)  SpO2: 92% (06-14-23 @ 04:56) (92% - 93%)  Telemetry:   CAPILLARY BLOOD GLUCOSE        I&O's Summary    13 Jun 2023 07:01  -  14 Jun 2023 07:00  --------------------------------------------------------  IN: 0 mL / OUT: 100 mL / NET: -100 mL    14 Jun 2023 07:01  -  14 Jun 2023 16:23  --------------------------------------------------------  IN: 240 mL / OUT: 0 mL / NET: 240 mL        PHYSICAL EXAM:  GENERAL: NAD, well-developed  HEAD:  Atraumatic, Normocephalic  EYES: EOMI, PERRLA, conjunctiva and sclera clear  NECK: Supple, No JVD  CHEST/LUNG: Clear to auscultation bilaterally; No wheeze  HEART: Regular rate and rhythm; No murmurs, rubs, or gallops  ABDOMEN: Soft, Nontender, Nondistended; Bowel sounds present  EXTREMITIES:  2+ Peripheral Pulses, No clubbing, cyanosis, or edema  PSYCH: AAOx3  NEUROLOGY: non-focal  SKIN: No rashes or lesions    LABS:                        11.4   5.20  )-----------( 246      ( 13 Jun 2023 07:13 )             33.3     06-14    140  |  103  |  10  ----------------------------<  112<H>  3.9   |  28  |  0.49<L>    Ca    9.1      14 Jun 2023 06:32                RADIOLOGY & ADDITIONAL TESTS:    Imaging Personally Reviewed:    Consultant(s) Notes Reviewed:      Care Discussed with Consultants/Other Providers:

## 2023-06-14 NOTE — PROGRESS NOTE ADULT - ASSESSMENT
88F with PMH/PSH including HTN, Aortic Stenosis,  Vocal cord paralysis and dysphagia presents to the ED with one week of "thrush" and difficulty swallowing.   Daughter states ptn sounds " junky" when she is breathing and talking. Patient was able to spell her name and provide her .  Daughter reports ptn was Rx'ed w Fluconazole and Mercaptopurine by ENT.  Denies previous episode of thrush.  Denies immunosuppressant, history of HIV.  Reports since then has not really improved, Ptn has had minimal po intake since 23.  Reports pain with swallowing and while interview has been spitting into emesis bag.  Placed on O2 by EMS, denies use at home.  Denies chest pain, shortness of breath, abdominal pain, nausea, vomiting, diarrhea, urinary complaints, fevers, chills, recent illness.    A/P  Aspiration PNA:   Thrush: seen by ent PTA, will cont Diflucan, get ENT eval here to address dysphagia and Vocal cord paralysis  Ascending Aortic Aneurism and h/o AS:   Dysphagia: place on dysphagia diet  HTN:   DVT      :    Aspiration PNA: on zosyn: no gross consolidation: wbc is normal :;  no fever: check procal:  ? short course of antibiotics'   Pulmonary fibrosis: She seems to have pulm fibrosis:  it was seen last time in 2022: there was no follow up after that  : I am not sure if pts family is aware about it:  recent ct scan showed: pulm fibrosis again : will dw family and will need to decide about her home oxygen requirement: check abg: in am   Thrush: seen by ent PTA, will cont Diflucan, get ENT eval here to address dysphagia and Vocal cord paralysis  Ascending Aortic Aneurism and h/o AS: echo awaited  Dysphagia: place on dysphagia diet  HTN:  controlled  DVT ; on xarelto    dw acp    612:    Aspiration PNA: on ceftriaxone todauy : no gross consolidation: wbc is normal :;  no fever: check procal:not done yet:    Pulmonary fibrosis: She seems to have pulm fibrosis:  it was seen last time in 2022: there was no follow up after that  : I am not sure if pts family is aware about it:  recent ct scan showed: pulm fibrosis again : will dw family and will need to decide about her home oxygen requirement: her abg today is with metabolic alkalosis:   Thrush: seen by ent PTA, will cont Diflucan, ent eval done: follow recommendations  Ascending Aortic Aneurism and h/o AS: echo awaited :probnp is pretty  high   Dysphagia:  on dysphagia diet  HTN:  controlled  DVT ; on xarelto    dw acp  and daughter at bedside    :    Aspiration PNA: on ceftriaxone day /7 : no gross consolidation: wbc is normal :; ordered procal  Pulmonary fibrosis: She seems to have pulm fibrosis:  it was seen last time in 2022: there was no follow up after that  : I am not sure if pts family is aware about it:  recent ct scan showed: pulm fibrosis again : will dw family and will need to decide about her home oxygen requirement: her abg today is with metabolic alkalosis:   Thrush: seen by ent PTA, will cont Diflucan, ent eval done: follow recommendations  Pulmonary nodule: Stable 4 mm right upper lobe nodule. can be followed up as anoutpt  Ascending Aortic Aneurism and h/o AS: echo awaited :probnp is pretty  high   Dysphagia:  on dysphagia diet  HTN:  controlled  DVT ; on xarelto    dw acp  and son and dil at bedside    :    Aspiration PNA: on ceftriaxone day  : no gross consolidation: wbc is normal :; procal 0.11  Pulmonary fibrosis: She seems to have pulm fibrosis:  it was seen last time in 2022: there was no follow up after that  : I am not sure if pts family is aware about it:  recent ct scan showed: pulm fibrosis again : will dw family and will need to decide about her home oxygen requirement: her abg today is with metabolic alkalosis:   Thrush: seen by ent PTA, will cont Diflucan, ent eval done: follow recommendations  Pulmonary nodule: Stable 4 mm right upper lobe nodule. can be followed up as anoutpt  Ascending Aortic Aneurism and h/o AS: echo awaited :probnp is pretty  high   Dysphagia:  on dysphagia diet  HTN:  controlled  DVT ; on xarelto    dw acp  and son and dil at bedside

## 2023-06-14 NOTE — PROGRESS NOTE ADULT - SUBJECTIVE AND OBJECTIVE BOX
Date of Service: 06-14-23 @ 16:49    Patient is a 88y old  Female who presents with a chief complaint of aspiration (14 Jun 2023 06:45)      Any change in ROS: seems OK: no sob:  no ocugh : no phlegm :       MEDICATIONS  (STANDING):  cefTRIAXone   IVPB 1000 milliGRAM(s) IV Intermittent every 24 hours  clotrimazole Lozenge 1 Lozenge Oral five times a day  fluconAZOLE IVPB      fluconAZOLE IVPB 100 milliGRAM(s) IV Intermittent every 24 hours  pantoprazole    Tablet 40 milliGRAM(s) Oral before breakfast  rivaroxaban 10 milliGRAM(s) Oral daily    MEDICATIONS  (PRN):  guaiFENesin Oral Liquid (Sugar-Free) 100 milliGRAM(s) Oral every 6 hours PRN Cough    Vital Signs Last 24 Hrs  T(C): 36.6 (14 Jun 2023 04:56), Max: 37.2 (13 Jun 2023 21:00)  T(F): 97.8 (14 Jun 2023 04:56), Max: 98.9 (13 Jun 2023 21:00)  HR: 92 (14 Jun 2023 04:56) (90 - 92)  BP: 130/85 (14 Jun 2023 04:56) (130/85 - 139/84)  BP(mean): --  RR: 18 (14 Jun 2023 04:56) (18 - 18)  SpO2: 92% (14 Jun 2023 04:56) (92% - 93%)    Parameters below as of 14 Jun 2023 04:56  Patient On (Oxygen Delivery Method): nasal cannula  O2 Flow (L/min): 2      I&O's Summary    13 Jun 2023 07:01  -  14 Jun 2023 07:00  --------------------------------------------------------  IN: 0 mL / OUT: 100 mL / NET: -100 mL    14 Jun 2023 07:01  -  14 Jun 2023 16:49  --------------------------------------------------------  IN: 240 mL / OUT: 0 mL / NET: 240 mL          Physical Exam:   GENERAL: NAD, well-groomed, well-developed  HEENT: YANET/   Atraumatic, Normocephalic  ENMT: No tonsillar erythema, exudates, or enlargement; Moist mucous membranes, Good dentition, No lesions  NECK: Supple, No JVD, Normal thyroid  CHEST/LUNG: Clear to auscultaion-no wheezing  GI: : Soft, Nontender, Nondistended; Bowel sounds present  NERVOUS SYSTEM:  Alert & Oriented X3  EXTREMITIES:  2+ Peripheral Pulses, No clubbing, cyanosis, or edema  LYMPH: No lymphadenopathy noted  SKIN: No rashes or lesions  ENDOCRINOLOGY: No Thyromegaly  PSYCH: Appropriate    Labs:                              11.4   5.20  )-----------( 246      ( 13 Jun 2023 07:13 )             33.3                         10.8   6.17  )-----------( 235      ( 12 Jun 2023 05:54 )             32.3                         12.5   7.52  )-----------( 247      ( 10 Arthur 2023 21:59 )             37.3     06-14    140  |  103  |  10  ----------------------------<  112<H>  3.9   |  28  |  0.49<L>  06-13    139  |  101  |  16  ----------------------------<  100<H>  3.0<L>   |  28  |  0.48<L>  06-12    140  |  101  |  22  ----------------------------<  85  3.1<L>   |  27  |  0.50  06-10    140  |  99  |  28<H>  ----------------------------<  117<H>  5.1   |  23  |  0.56    Ca    9.1      14 Jun 2023 06:32  Ca    9.0      13 Jun 2023 07:13    TPro  8.5<H>  /  Alb  3.7  /  TBili  1.0  /  DBili  x   /  AST  38  /  ALT  13  /  AlkPhos  79  06-10    CAPILLARY BLOOD GLUCOSE                Procalcitonin, Serum: 0.11 ng/mL (06-13 @ 16:24)        RECENT CULTURES:  06-12 @ 10:56 .Sputum Sputum       Numerous polymorphonuclear leukocytes per low power field  Numerous Squamous epithelial cells per low power field  Few Gram Positive Rods seen per oil power field           Normal Respiratory Lu present    06-11 @ 01:13 Clean Catch Clean Catch (Midstream)     rad< from: CT Chest No Cont (06.11.23 @ 01:58) >  BONES/SOFT TISSUES: Degenerative changes. Compression deformities of T7,   and T9 are unchanged from 12/6/2022. T12 compression deformity is also   appreciated. This was not imaged on the previous exam.    IMPRESSION:  Scattered groundglass nodular and patchy opacities as well as clusters of   tree in bud type nodular opacities as described above. This is likely   related to multifocal infection for which both typical and atypical   etiologies should be considered. Pattern of tree-in-bud typelower lobe   nodules may suggest aspiration as well. Correlate clinically.    Bronchitis/bronchiolitis.    Stable ascending aortic dilatation.    Stable 4 mm right upper lobe nodule.    Mild fibrotic changes, stable.    Cardiomegaly and small pericardial effusion.    < end of copied text >             <10,000 CFU/mL Normal Urogenital Lu    06-10 @ 21:32 .Blood Blood-Peripheral                No growth to date.    06-10 @ 20:58 .Blood Blood-Peripheral                No growth to date.          RESPIRATORY CULTURES:          Studies  Chest X-RAY  CT SCAN Chest   Venous Dopplers: LE:   CT Abdomen  Others

## 2023-06-14 NOTE — PROGRESS NOTE ADULT - ASSESSMENT
A/P  88F with PMH/PSH including HTN, Aortic Stenosis,  Vocal cord paralysis and dysphagia presents to the ED with one week of "thrush" and difficulty swallowing.         #Aortic Stenosis  -Stable, no overload on exam  -f/u echo    #Aortic Aneurysm  -3.7cm, Stable on CT  -No reports of cp, back pain, sob    #Candidal Esophagitis  -Fluconazole per id  -Mgmt per med, GI    #Dysphagia  -CT w/ concern for aspiration pna  -W/u per med, gi, neuro  -Abx per id      Please call/text me with questions/concerns between 8am-4pm  [phone]

## 2023-06-14 NOTE — PROGRESS NOTE ADULT - SUBJECTIVE AND OBJECTIVE BOX
Patient is a 88y Female     Patient is a 88y old  Female who presents with a chief complaint of dysphagia (2023 07:01)      HPI:  88F with PMH/PSH including HTN, Aortic Stenosis,  Vocal cord paralysis and dysphagia presents to the ED with one week of "thrush" and difficulty swallowing.   Daughter states ptn sounds " junky" when she is breathing and talking. Patient was able to spell her name and provide her .  Daughter reports ptn was Rx'ed w Fluconazole and Mercaptopurine by ENT.  Denies previous episode of thrush.  Denies immunosuppressant, history of HIV.  Reports since then has not really improved, Ptn has had minimal po intake since 23.  Reports pain with swallowing and while interview has been spitting into emesis bag.  Placed on O2 by EMS, denies use at home.  Denies chest pain, shortness of breath, abdominal pain, nausea, vomiting, diarrhea, urinary complaints, fevers, chills, recent illness. (2023 11:59)      PAST MEDICAL & SURGICAL HISTORY:  Benign Hypertension (ICD9 401.1)      HLD (Hyperlipidemia) (ICD9 272.4)      Crohn's Disease of Intestine (ICD9 555.9)      Mitral Regurgitation (ICD9 424.0)      Aortic Stenosis (ICD9 424.1)      Chronic Osteoarthritis (ICD9 715.90)      Osteopenia (ICD9 733.90)      C Section (ICD9 669.70)      C Section (ICD9 669.70)      C Section (ICD9 669.70)      C Section (ICD9 669.70)          MEDICATIONS  (STANDING):  cefTRIAXone   IVPB 1000 milliGRAM(s) IV Intermittent every 24 hours  clotrimazole Lozenge 1 Lozenge Oral five times a day  fluconAZOLE IVPB      fluconAZOLE IVPB 100 milliGRAM(s) IV Intermittent every 24 hours  lactated ringers. 1000 milliLiter(s) (50 mL/Hr) IV Continuous <Continuous>  losartan 25 milliGRAM(s) Oral daily  mercaptopurine (Non - oncologic) 50 milliGRAM(s) Oral daily  pantoprazole    Tablet 40 milliGRAM(s) Oral before breakfast  rivaroxaban 10 milliGRAM(s) Oral daily      Allergies    Bacitracin ointment (Rash)  No Known Drug Allergies  latex (Rash)    Intolerances        SOCIAL HISTORY:  Denies ETOh,Smoking,     FAMILY HISTORY:      REVIEW OF SYSTEMS:    CONSTITUTIONAL: No weakness, fevers or chills  EYES/ENT: No visual changes;  No vertigo or throat pain   NECK: No pain or stiffness  RESPIRATORY: No cough, wheezing, hemoptysis; No shortness of breath  CARDIOVASCULAR: No chest pain or palpitations  GASTROINTESTINAL: No abdominal or epigastric pain. No nausea, vomiting, or hematemesis; No diarrhea or constipation. No melena or hematochezia.  GENITOURINARY: No dysuria, frequency or hematuria  NEUROLOGICAL: No numbness or weakness  SKIN: No itching, burning, rashes, or lesions   All other review of systems is negative unless indicated above.    VITAL:  T(C): , Max: 37.2 (23 @ 21:00)  T(F): , Max: 98.9 (23 @ 21:00)  HR: 92 (23 @ 04:56)  BP: 130/85 (23 @ 04:56)  BP(mean): --  RR: 18 (23 @ 04:56)  SpO2: 92% (23 @ 04:56)  Wt(kg): --    I and O's:     @ :  -   @ 07:00  --------------------------------------------------------  IN: 720 mL / OUT: 200 mL / NET: 520 mL     @ 07:  -   @ 07:00  --------------------------------------------------------  IN: 0 mL / OUT: 400 mL / NET: -400 mL     @ 07:  -   @ 06:45  --------------------------------------------------------  IN: 0 mL / OUT: 100 mL / NET: -100 mL          PHYSICAL EXAM:    Constitutional: NAD  HEENT: PERRLA,   Neck: No JVD  Respiratory: CTA B/L  Cardiovascular: S1 and S2  Gastrointestinal: BS+, soft, NT/ND  Extremities: No peripheral edema  Neurological: A/O x 3, no focal deficits  Psychiatric: Normal mood, normal affect  : No Bazzi  Skin: No rashes  Access: Not applicable  Back: No CVA tenderness    LABS:                        11.4   5.20  )-----------( 246      ( 2023 07:13 )             33.3     06-13    139  |  101  |  16  ----------------------------<  100<H>  3.0<L>   |  28  |  0.48<L>    Ca    9.0      2023 07:13            RADIOLOGY & ADDITIONAL STUDIES:

## 2023-06-14 NOTE — PROGRESS NOTE ADULT - SUBJECTIVE AND OBJECTIVE BOX
CARDIOLOGY FOLLOW UP - Dr. Tafoya  DATE OF SERVICE: 6/14/23    CC  No CV complaints    REVIEW OF SYSTEMS:  CONSTITUTIONAL: No fever, weight loss, or fatigue  RESPIRATORY: No cough, wheezing, chills or hemoptysis; No Shortness of Breath  CARDIOVASCULAR: No chest pain, palpitations, passing out, dizziness, or leg swelling  GASTROINTESTINAL: No abdominal or epigastric pain. No nausea, vomiting, or hematemesis; No diarrhea or constipation. No melena or hematochezia.  VASCULAR: No edema     PHYSICAL EXAM:  T(C): 36.6 (06-14-23 @ 04:56), Max: 37.2 (06-13-23 @ 21:00)  HR: 92 (06-14-23 @ 04:56) (90 - 92)  BP: 130/85 (06-14-23 @ 04:56) (130/85 - 139/84)  RR: 18 (06-14-23 @ 04:56) (18 - 18)  SpO2: 92% (06-14-23 @ 04:56) (92% - 93%)  Wt(kg): --  I&O's Summary    13 Jun 2023 07:01  -  14 Jun 2023 07:00  --------------------------------------------------------  IN: 0 mL / OUT: 100 mL / NET: -100 mL        Appearance: Elderly female	  Cardiovascular: Normal S1 S2,RRR, No JVD, +systolic murmur  Respiratory: Lungs clear to auscultation	  Gastrointestinal:  Soft, Non-tender, + BS	  Extremities: Normal range of motion, No clubbing, cyanosis or edema      Home Medications:  fluconazole 100 mg oral tablet: 2 tab(s) orally for 1 day then 1 tab once a day for 9 days (11 Jun 2023 10:18)  losartan 25 mg oral tablet: 1 tab(s) orally once a day (11 Jun 2023 10:18)  mercaptopurine 50 mg oral tablet: 1 tab(s) orally once a day (11 Jun 2023 10:18)  mesalamine 500 mg oral capsule, extended release: 2 cap(s) orally 4 times a day (11 Jun 2023 10:18)      MEDICATIONS  (STANDING):  cefTRIAXone   IVPB 1000 milliGRAM(s) IV Intermittent every 24 hours  clotrimazole Lozenge 1 Lozenge Oral five times a day  fluconAZOLE IVPB      fluconAZOLE IVPB 100 milliGRAM(s) IV Intermittent every 24 hours  lactated ringers. 1000 milliLiter(s) (50 mL/Hr) IV Continuous <Continuous>  losartan 25 milliGRAM(s) Oral daily  mercaptopurine (Non - oncologic) 50 milliGRAM(s) Oral daily  pantoprazole    Tablet 40 milliGRAM(s) Oral before breakfast  rivaroxaban 10 milliGRAM(s) Oral daily      TELEMETRY: 	    ECG:  	  RADIOLOGY:   DIAGNOSTIC TESTING:  [ ] Echocardiogram:  [ ]  Catheterization:  [ ] Stress Test:    OTHER: 	    LABS:	 	                            11.4   5.20  )-----------( 246      ( 13 Jun 2023 07:13 )             33.3     06-14    140  |  103  |  10  ----------------------------<  112<H>  3.9   |  28  |  0.49<L>    Ca    9.1      14 Jun 2023 06:32

## 2023-06-14 NOTE — PROGRESS NOTE ADULT - SUBJECTIVE AND OBJECTIVE BOX
CC: f/u for asp PNA    Patient reports no new c/o, feels better    REVIEW OF SYSTEMS: no fevers, no chills, no nausea, no vomiting, no abd pain, no resp symptoms  All other review of systems negative (Comprehensive ROS)    Antimicrobials Day #    cefTRIAXone   IVPB 1000 milliGRAM(s) IV Intermittent every 24 hours  clotrimazole Lozenge 1 Lozenge Oral five times a day  fluconAZOLE IVPB      fluconAZOLE IVPB 100 milliGRAM(s) IV Intermittent every 24 hours        Other Medications Reviewed    Vital Signs Last 24 Hrs  T(C): 36.6 (2023 04:56), Max: 37.2 (2023 21:00)  T(F): 97.8 (2023 04:56), Max: 98.9 (2023 21:00)  HR: 92 (2023 04:56) (90 - 92)  BP: 130/85 (2023 04:56) (130/85 - 139/84)  BP(mean): --  RR: 18 (2023 04:56) (18 - 18)  SpO2: 92% (2023 04:56) (92% - 93%)    Parameters below as of 2023 04:56  Patient On (Oxygen Delivery Method): nasal cannula  O2 Flow (L/min): 2        PHYSICAL EXAM:  General: alert, no acute distress  Eyes:  anicteric, no conjunctival injection, no discharge  Oropharynx: no lesions or injection 	  Neck: supple, without adenopathy  Lungs: clear to auscultation  Heart: regular rate and rhythm; no murmur, rubs or gallops  Abdomen: soft, nondistended, nontender, without mass or organomegaly  Skin: no lesions  Extremities: no clubbing, cyanosis, or edema  Neurologic: alert, oriented, moves all extremities    LAB RESULTS:                                   11.4   5.20  )-----------( 246      ( 2023 07:13 )             33.3   06-13    139  |  101  |  16  ----------------------------<  100<H>  3.0<L>   |  28  |  0.48<L>    Ca    9.0      2023 07:13        Urinalysis Basic - ( 2023 06:37 )    Color: Yellow / Appearance: Turbid / S.044 / pH: x  Gluc: x / Ketone: Trace  / Bili: Negative / Urobili: Negative   Blood: x / Protein: 100 mg/dL / Nitrite: Negative   Leuk Esterase: Negative / RBC: 2 /hpf / WBC 2 /HPF   Sq Epi: x / Non Sq Epi: x / Bacteria: Negative        MICROBIOLOGY REVIEWED:  Culture - Sputum . (23 @ 10:56)   Gram Stain:   Numerous polymorphonuclear leukocytes per low power field   Numerous Squamous epithelial cells per low power field   Few Gram Positive Rods seen per oil power field  Specimen Source: .Sputum Sputum  RADIOLOGY REVIEWED:    < from: CT Chest No Cont (23 @ 01:58) >  IMPRESSION:  Scattered groundglass nodular and patchy opacities as well as clusters of   tree in bud type nodular opacities as described above. This is likely   related to multifocal infection for which both typical and atypical   etiologies should be considered. Pattern of tree-in-bud typelower lobe   nodules may suggest aspiration as well. Correlate clinically.    Bronchitis/bronchiolitis.    Stable ascending aortic dilatation.    Stable 4 mm right upper lobe nodule.    Mild fibrotic changes, stable.    Cardiomegaly and small pericardial effusion.    < end of copied text >

## 2023-06-14 NOTE — PROGRESS NOTE ADULT - ASSESSMENT
88F with PMH/PSH including HTN, Aortic Stenosis,  Vocal cord paralysis and dysphagia presents to the ED with one week of "thrush" and difficulty swallowing.   Daughter states ptn sounds " junky" when she is breathing and talking. Patient was able to spell her name and provide her .  Daughter reports ptn was Rx'ed w Fluconazole and Mercaptopurine by ENT.  Denies previous episode of thrush.  Denies immunosuppressant, history of HIV.  Reports since then has not really improved, Ptn has had minimal po intake since 23.  Reports pain with swallowing and while interview has been spitting into emesis bag.  Placed on O2 by EMS, denies use at home.  Denies chest pain, shortness of breath, abdominal pain, nausea, vomiting, diarrhea, urinary complaints, fevers, chills, recent illness.    A/P  Aspiration PNA: seen by ID and pulm.   on dysphagia 2 diet w mod thickened liquids, this was recommended after MBS on   Thrush: seen by ent PTA, will cont Diflucan x 1 week, ENT eval here DONE: no vocal cord paralysis was observed.   Ascending Aortic Aneurism   TTE: severe AS, severe MR, mod pulm HTN  Volume depetion resolved post IVF, presently euvolemic  Dysphagia: cont on dysphagia diet  HTN: cont LOSARTAN  DVT ppx w po xarelto  DC planning 88F with PMH/PSH including HTN, Aortic Stenosis,  Vocal cord paralysis and dysphagia presents to the ED with one week of "thrush" and difficulty swallowing.   Daughter states ptn sounds " junky" when she is breathing and talking. Patient was able to spell her name and provide her .  Daughter reports ptn was Rx'ed w Fluconazole and Mercaptopurine by ENT.  Denies previous episode of thrush.  Denies immunosuppressant, history of HIV.  Reports since then has not really improved, Ptn has had minimal po intake since 23.  Reports pain with swallowing and while interview has been spitting into emesis bag.  Placed on O2 by EMS, denies use at home.  Denies chest pain, shortness of breath, abdominal pain, nausea, vomiting, diarrhea, urinary complaints, fevers, chills, recent illness.    A/P  Aspiration PNA: seen by ID and pulm.   on dysphagia 2 diet w mod thickened liquids, this was recommended after MBS on   Thrush: seen by ent PTA, will cont Diflucan x 1 week, ENT eval here DONE: no vocal cord paralysis was observed.   Ascending Aortic Aneurism   TTE: severe AS, severe MR, mod pulm HTN  will place on Hydralazine 10 mg tid, dc Losartan. does she need to be on daily lasix? will d/w cardiology  Volume depletion resolved post IVF, presently euvolemic  Dysphagia: cont on dysphagia diet  HTN: cont LOSARTAN  DVT ppx w po xarelto  DC planning

## 2023-06-14 NOTE — PROGRESS NOTE ADULT - ASSESSMENT
1) pt with no acute complaints  2) appreicate speech and swallow  3) appreicate id for aspiration  4) no acute complaints  5) would not place peg, risk>benefit  6) supportive care

## 2023-06-14 NOTE — PROGRESS NOTE ADULT - ASSESSMENT
89 yo woman with ibd, has developed thrush and was given po diflucan but despite this she has ongoing dysphagia and gurgling. CT suggest aspiration and poor pulmonary toilet. ENT here shows some mild thrush but vocal cords work ok and no other pathology.  Suspect she has aspiration pneumonia with partially treated thrush.   Unclear if the dysphagia was caused by thrush alone or if she has some other cause.   No recent hospitalizations  sputum --normal resp tiarra  Recommendations:  on iv diflucan for now while dysphagia w/u in progress, can switch to po diflucan  cover pneumonia with ceftriaxone for now day 5/7, can switch to PO ceftin  dc planning

## 2023-06-15 ENCOUNTER — TRANSCRIPTION ENCOUNTER (OUTPATIENT)
Age: 88
End: 2023-06-15

## 2023-06-15 LAB
ANION GAP SERPL CALC-SCNC: 11 MMOL/L — SIGNIFICANT CHANGE UP (ref 5–17)
BUN SERPL-MCNC: 7 MG/DL — SIGNIFICANT CHANGE UP (ref 7–23)
CALCIUM SERPL-MCNC: 9.5 MG/DL — SIGNIFICANT CHANGE UP (ref 8.4–10.5)
CHLORIDE SERPL-SCNC: 101 MMOL/L — SIGNIFICANT CHANGE UP (ref 96–108)
CO2 SERPL-SCNC: 30 MMOL/L — SIGNIFICANT CHANGE UP (ref 22–31)
CREAT SERPL-MCNC: 0.58 MG/DL — SIGNIFICANT CHANGE UP (ref 0.5–1.3)
EGFR: 87 ML/MIN/1.73M2 — SIGNIFICANT CHANGE UP
GLUCOSE SERPL-MCNC: 103 MG/DL — HIGH (ref 70–99)
HCT VFR BLD CALC: 37.1 % — SIGNIFICANT CHANGE UP (ref 34.5–45)
HGB BLD-MCNC: 12.3 G/DL — SIGNIFICANT CHANGE UP (ref 11.5–15.5)
MCHC RBC-ENTMCNC: 31.6 PG — SIGNIFICANT CHANGE UP (ref 27–34)
MCHC RBC-ENTMCNC: 33.2 GM/DL — SIGNIFICANT CHANGE UP (ref 32–36)
MCV RBC AUTO: 95.4 FL — SIGNIFICANT CHANGE UP (ref 80–100)
NRBC # BLD: 0 /100 WBCS — SIGNIFICANT CHANGE UP (ref 0–0)
PLATELET # BLD AUTO: 276 K/UL — SIGNIFICANT CHANGE UP (ref 150–400)
POTASSIUM SERPL-MCNC: 3.8 MMOL/L — SIGNIFICANT CHANGE UP (ref 3.5–5.3)
POTASSIUM SERPL-SCNC: 3.8 MMOL/L — SIGNIFICANT CHANGE UP (ref 3.5–5.3)
RBC # BLD: 3.89 M/UL — SIGNIFICANT CHANGE UP (ref 3.8–5.2)
RBC # FLD: 13.5 % — SIGNIFICANT CHANGE UP (ref 10.3–14.5)
SODIUM SERPL-SCNC: 142 MMOL/L — SIGNIFICANT CHANGE UP (ref 135–145)
WBC # BLD: 6.76 K/UL — SIGNIFICANT CHANGE UP (ref 3.8–10.5)
WBC # FLD AUTO: 6.76 K/UL — SIGNIFICANT CHANGE UP (ref 3.8–10.5)

## 2023-06-15 RX ORDER — ISOSORBIDE DINITRATE 5 MG/1
5 TABLET ORAL THREE TIMES A DAY
Refills: 0 | Status: DISCONTINUED | OUTPATIENT
Start: 2023-06-15 | End: 2023-06-16

## 2023-06-15 RX ADMIN — FLUCONAZOLE 50 MILLIGRAM(S): 150 TABLET ORAL at 12:35

## 2023-06-15 RX ADMIN — Medication 1 LOZENGE: at 08:33

## 2023-06-15 RX ADMIN — Medication 10 MILLIGRAM(S): at 05:33

## 2023-06-15 RX ADMIN — Medication 10 MILLIGRAM(S): at 22:20

## 2023-06-15 RX ADMIN — RIVAROXABAN 10 MILLIGRAM(S): KIT at 12:35

## 2023-06-15 RX ADMIN — Medication 1 LOZENGE: at 20:41

## 2023-06-15 RX ADMIN — Medication 1 LOZENGE: at 18:13

## 2023-06-15 RX ADMIN — Medication 10 MILLIGRAM(S): at 14:12

## 2023-06-15 RX ADMIN — Medication 1 LOZENGE: at 12:35

## 2023-06-15 RX ADMIN — ISOSORBIDE DINITRATE 5 MILLIGRAM(S): 5 TABLET ORAL at 18:13

## 2023-06-15 RX ADMIN — PANTOPRAZOLE SODIUM 40 MILLIGRAM(S): 20 TABLET, DELAYED RELEASE ORAL at 08:33

## 2023-06-15 RX ADMIN — Medication 1 LOZENGE: at 00:16

## 2023-06-15 RX ADMIN — CEFTRIAXONE 100 MILLIGRAM(S): 500 INJECTION, POWDER, FOR SOLUTION INTRAMUSCULAR; INTRAVENOUS at 18:13

## 2023-06-15 NOTE — DISCHARGE NOTE PROVIDER - NSDCFUSCHEDAPPT_GEN_ALL_CORE_FT
[facility] Physician [facility]  CARENA Patient Nadine  Scheduled Appointment: 06/21/2023

## 2023-06-15 NOTE — PROGRESS NOTE ADULT - ASSESSMENT
88F with PMH/PSH including HTN, Aortic Stenosis,  Vocal cord paralysis and dysphagia presents to the ED with one week of "thrush" and difficulty swallowing.   Daughter states ptn sounds " junky" when she is breathing and talking. Patient was able to spell her name and provide her .  Daughter reports ptn was Rx'ed w Fluconazole and Mercaptopurine by ENT.  Denies previous episode of thrush.  Denies immunosuppressant, history of HIV.  Reports since then has not really improved, Ptn has had minimal po intake since 23.  Reports pain with swallowing and while interview has been spitting into emesis bag.  Placed on O2 by EMS, denies use at home.  Denies chest pain, shortness of breath, abdominal pain, nausea, vomiting, diarrhea, urinary complaints, fevers, chills, recent illness.    A/P  Aspiration PNA: seen by ID and pulm.   on dysphagia 2 diet w mod thickened liquids, this was recommended after MBS on   Thrush: seen by ent PTA, will cont Diflucan x 1 week, ENT eval here DONE: no vocal cord paralysis was observed.   Ascending Aortic Aneurism   TTE: severe AS, severe MR, mod pulm HTN  cont  Hydralazine 10 mg tid, add Isordil 5 mg tid  Volume depletion resolved post IVF, presently euvolemic  Dysphagia: cont on dysphagia diet  HTN: cont hydralazine , isordil added  DVT ppx w po xarelto  DC planning

## 2023-06-15 NOTE — PROGRESS NOTE ADULT - ASSESSMENT
1) pt with dysphagia  2) appreicate id aspiration  3) swallow evalation appreiated  4) conservative gi [facility]  5) would avoid peg  6) continue current reigmen

## 2023-06-15 NOTE — DISCHARGE NOTE PROVIDER - NSDCFUADDAPPT_GEN_ALL_CORE_FT
APPTS ARE READY TO BE MADE: [x] YES    Best Family or Patient Contact (if needed):    Additional Information about above appointments (if needed):    1:   2:   3:     Other comments or requests:    APPTS ARE READY TO BE MADE: [x] YES    Best Family or Patient Contact (if needed): Daughter Daysi Cancino    Additional Information about above appointments (if needed):    1:   2:   3:     Other comments or requests:    APPTS ARE READY TO BE MADE: [x] YES    Best Family or Patient Contact (if needed): Daughter Daysi Cancino    Additional Information about above appointments (if needed):    1:   2:   3:     Other comments or requests:     Patient was previously scheduled to see Dr. Bowden on Thursday 6/22 at [address] [place] [address], [address] [place] [address] APPTS ARE READY TO BE MADE: [x] YES    Best Family or Patient Contact (if needed): Daughter Daysi Cancino    Additional Information about above appointments (if needed):    1:   2:   3:     Other comments or requests:     Patient was previously scheduled to see Dr. Bowden on Thursday 6/22 at [address] [place] [address], [address] [place] [address]    Patient was provided with follow up request details and was advised to call to schedule follow up within specified time frame.

## 2023-06-15 NOTE — DISCHARGE NOTE PROVIDER - HOSPITAL COURSE
HPI: 88 year old female with PMH/PSH including HTN, Aortic Stenosis,  Vocal cord paralysis and dysphagia presents to the ED with one week of "thrush" and difficulty swallowing.   Daughter states ptn sounds " junky" when she is breathing and talking. Patient was able to spell her name and provide her .  Daughter reports ptn was Rx'ed w Fluconazole and Mercaptopurine by ENT.  Denies previous episode of thrush.  Denies immunosuppressant, history of HIV.  Reports since then has not really improved, Ptn has had minimal po intake since 23.  Reports pain with swallowing and while interview has been spitting into emesis bag.  Placed on O2 by EMS, denies use at home.  Denies chest pain, shortness of breath, abdominal pain, nausea, vomiting, diarrhea, urinary complaints, fevers, chills, recent illness.    Hospital Course: CT chest revealed Scattered groundglass nodular and patchy opacities as well as clusters of tree in bud type nodular opacities as described above. This is likely related to multifocal infection. Pattern of tree-in-bud type lower lobe nodules may suggest aspiration as well. Stable 4 mm right upper lobe nodule. can be followed up as an outpt. ID and pulmonology were consulted - suspecting she has aspiration pneumonia with partially treated thrush. Unclear if the dysphagia was caused by thrush alone or if she has some other cause - Recommendations to complete Diflucan. Patient was also seen by S+S s/p MBS: recommended for soft bite sized/moderately thick liquids by teaspoon; Use of CHIN TUCK for all po intake. ENT also evaluated patient s/p indirect laryngoscopy revealing minimal laryngeal involvement normal vocal cords, as well as minimal posterior arytenoid edema noted on indirect laryngoscopy. Neurology also consulted for Dysphagia: While initial notes write vocal cord paralysis, the ent note states vocal cords were mobile. While dysphagia can be seen with neurological etiologies, the lack of other symptoms, findings on exam, and nl CT head made them much less likely. No further neurological evaluation at this time. GI also consulted - recommending would not peg - conservative management.     Cardiology was consulted in setting of 3.7 cm, stable ascending Aortic Aneurism seen on CT and Aortic Stenosis. TTE: severe AS, severe MR, mod pulm HTN    Discharge/Dispo/Med rec discussed with attending  ____. Patient medically cleared for discharge ____ with outpatient follow up           HPI: 88 year old female with PMH/PSH including HTN, Aortic Stenosis,  Vocal cord paralysis and dysphagia presents to the ED with one week of "thrush" and difficulty swallowing.   Daughter states ptn sounds " junky" when she is breathing and talking. Patient was able to spell her name and provide her .  Daughter reports ptn was Rx'ed w Fluconazole and Mercaptopurine by ENT.  Denies previous episode of thrush.  Denies immunosuppressant, history of HIV.  Reports since then has not really improved, Ptn has had minimal po intake since 23.  Reports pain with swallowing and while interview has been spitting into emesis bag.  Placed on O2 by EMS, denies use at home.  Denies chest pain, shortness of breath, abdominal pain, nausea, vomiting, diarrhea, urinary complaints, fevers, chills, recent illness.    Hospital Course: CT chest revealed Scattered ground glass nodular and patchy opacities as well as clusters of tree in bud type nodular opacities as described above. This is likely related to multifocal infection. Pattern of tree-in-bud type lower lobe nodules may suggest aspiration as well. Stable 4 mm right upper lobe nodule. can be followed up as an outpt. ID and pulmonology were consulted - suspecting she has aspiration pneumonia with partially treated thrush. Unclear if the dysphagia was caused by thrush alone or if she has some other cause - Recommendations to complete Diflucan. Patient was also seen by S+S s/p MBS: recommended for soft bite sized/moderately thick liquids by teaspoon; Use of CHIN TUCK for all po intake. ENT also evaluated patient s/p indirect laryngoscopy revealing minimal laryngeal involvement normal vocal cords, as well as minimal posterior arytenoid edema noted on indirect laryngoscopy. Neurology also consulted for Dysphagia: While initial notes write vocal cord paralysis, the ent note states vocal cords were mobile. While dysphagia can be seen with neurological etiologies, the lack of other symptoms, findings on exam, and nl CT head made them much less likely. No further neurological evaluation at this time. GI also consulted - recommending would not peg - conservative management.     Cardiology was consulted in setting of 3.7 cm, stable ascending Aortic Aneurism seen on CT and Aortic Stenosis. TTE: severe AS, severe MR, mod pulm HTN    Discharge/Dispo/Med rec discussed with attending Dr. Canas. Patient medically cleared for discharge per Dr Canas,  with outpatient follow up.

## 2023-06-15 NOTE — DISCHARGE NOTE PROVIDER - PROVIDER TOKENS
PROVIDER:[TOKEN:[[id number]],FOLLOWUP:[1 week]],PROVIDER:[TOKEN:[[id number]]] PROVIDER:[TOKEN:[[id number]],FOLLOWUP:[1 week],ESTABLISHEDPATIENT:[T]],PROVIDER:[TOKEN:[[id number]]],PROVIDER:[TOKEN:[[id number]],FOLLOWUP:[1 week]]

## 2023-06-15 NOTE — PROGRESS NOTE ADULT - ASSESSMENT
88F with PMH/PSH including HTN, Aortic Stenosis,  Vocal cord paralysis and dysphagia presents to the ED with one week of "thrush" and difficulty swallowing.   Daughter states ptn sounds " junky" when she is breathing and talking. Patient was able to spell her name and provide her .  Daughter reports ptn was Rx'ed w Fluconazole and Mercaptopurine by ENT.  Denies previous episode of thrush.  Denies immunosuppressant, history of HIV.  Reports since then has not really improved, Ptn has had minimal po intake since 23.  Reports pain with swallowing and while interview has been spitting into emesis bag.  Placed on O2 by EMS, denies use at home.  Denies chest pain, shortness of breath, abdominal pain, nausea, vomiting, diarrhea, urinary complaints, fevers, chills, recent illness.    A/P  Aspiration PNA:   Thrush: seen by ent PTA, will cont Diflucan, get ENT eval here to address dysphagia and Vocal cord paralysis  Ascending Aortic Aneurism and h/o AS:   Dysphagia: place on dysphagia diet  HTN:   DVT      :    Aspiration PNA: on zosyn: no gross consolidation: wbc is normal :;  no fever: check procal:  ? short course of antibiotics'   Pulmonary fibrosis: She seems to have pulm fibrosis:  it was seen last time in 2022: there was no follow up after that  : I am not sure if pts family is aware about it:  recent ct scan showed: pulm fibrosis again : will dw family and will need to decide about her home oxygen requirement: check abg: in am   Thrush: seen by ent PTA, will cont Diflucan, get ENT eval here to address dysphagia and Vocal cord paralysis  Ascending Aortic Aneurism and h/o AS: echo awaited  Dysphagia: place on dysphagia diet  HTN:  controlled  DVT ; on xarelto    dw acp    612:    Aspiration PNA: on ceftriaxone todauy : no gross consolidation: wbc is normal :;  no fever: check procal:not done yet:    Pulmonary fibrosis: She seems to have pulm fibrosis:  it was seen last time in 2022: there was no follow up after that  : I am not sure if pts family is aware about it:  recent ct scan showed: pulm fibrosis again : will dw family and will need to decide about her home oxygen requirement: her abg today is with metabolic alkalosis:   Thrush: seen by ent PTA, will cont Diflucan, ent eval done: follow recommendations  Ascending Aortic Aneurism and h/o AS: echo awaited :probnp is pretty  high   Dysphagia:  on dysphagia diet  HTN:  controlled  DVT ; on xarelto    dw acp  and daughter at bedside    :    Aspiration PNA: on ceftriaxone day 4/7 : no gross consolidation: wbc is normal :; ordered procal  Pulmonary fibrosis: She seems to have pulm fibrosis:  it was seen last time in 2022: there was no follow up after that  : I am not sure if pts family is aware about it:  recent ct scan showed: pulm fibrosis again : will dw family and will need to decide about her home oxygen requirement: her abg today is with metabolic alkalosis:   Thrush: seen by ent PTA, will cont Diflucan, ent eval done: follow recommendations  Pulmonary nodule: Stable 4 mm right upper lobe nodule. can be followed up as anoutpt  Ascending Aortic Aneurism and h/o AS: echo awaited :probnp is pretty  high   Dysphagia:  on dysphagia diet  HTN:  controlled  DVT ; on xarelto    dw acp  and son and dil at bedside    :    Aspiration PNA: on ceftriaxone day /7 : no gross consolidation: wbc is normal :; procal 0.11  Pulmonary fibrosis: She seems to have pulm fibrosis:  it was seen last time in 2022: there was no follow up after that  : I am not sure if pts family is aware about it:  recent ct scan showed: pulm fibrosis again : will dw family and will need to decide about her home oxygen requirement: her abg today is with metabolic alkalosis:   Thrush: seen by ent PTA, will cont Diflucan, ent eval done: follow recommendations  Pulmonary nodule: Stable 4 mm right upper lobe nodule. can be followed up as anoutpt  Ascending Aortic Aneurism and h/o AS: echo awaited :probnp is pretty  high   Dysphagia:  on dysphagia diet  HTN:  controlled  DVT ; on xarelto    dw acp  and son and dil at bedside    6/15:    Aspiration PNA: on ceftriaxone day 67 : no gross consolidation: wbc is normal :; procal 0.11  Pulmonary fibrosis: She seems to have pulm fibrosis:  it was seen last time in 2022: the daughter is now aware of the pulm fibrosis  Thrush: seen by ent PTA, will cont Diflucan, ent eval done: follow recommendations  Pulmonary nodule: Stable 4 mm right upper lobe nodule. can be followed up as anoutpt  Ascending Aortic Aneurism and h/o AS: echo awaited :probnp is pretty  high   Dysphagia:  on dysphagia diet  HTN:  controlled  DVT ; on xarelto    dw acp  and son and dil at bedside

## 2023-06-15 NOTE — DISCHARGE NOTE PROVIDER - NSDCMRMEDTOKEN_GEN_ALL_CORE_FT
fluconazole 100 mg oral tablet: 2 tab(s) orally for 1 day then 1 tab once a day for 9 days  losartan 25 mg oral tablet: 1 tab(s) orally once a day  mercaptopurine 50 mg oral tablet: 1 tab(s) orally once a day  mesalamine 500 mg oral capsule, extended release: 2 cap(s) orally 4 times a day   fluconazole 100 mg oral tablet: 2 tab(s) orally for 1 day then 1 tab once a day for 9 days  hydrALAZINE 10 mg oral tablet: 1 tab(s) orally 3 times a day  isosorbide dinitrate 5 mg oral tablet: 1 tab(s) orally 3 times a day  mercaptopurine 50 mg oral tablet: 1 tab(s) orally once a day  mesalamine 500 mg oral capsule, extended release: 2 cap(s) orally 4 times a day  pantoprazole 40 mg oral delayed release tablet: 1 tab(s) orally once a day before breakfast

## 2023-06-15 NOTE — PROGRESS NOTE ADULT - SUBJECTIVE AND OBJECTIVE BOX
Patient is a 88y Female     Patient is a 88y old  Female who presents with a chief complaint of aspiration (2023 06:45)      HPI:  88F with PMH/PSH including HTN, Aortic Stenosis,  Vocal cord paralysis and dysphagia presents to the ED with one week of "thrush" and difficulty swallowing.   Daughter states ptn sounds " junky" when she is breathing and talking. Patient was able to spell her name and provide her .  Daughter reports ptn was Rx'ed w Fluconazole and Mercaptopurine by ENT.  Denies previous episode of thrush.  Denies immunosuppressant, history of HIV.  Reports since then has not really improved, Ptn has had minimal po intake since 23.  Reports pain with swallowing and while interview has been spitting into emesis bag.  Placed on O2 by EMS, denies use at home.  Denies chest pain, shortness of breath, abdominal pain, nausea, vomiting, diarrhea, urinary complaints, fevers, chills, recent illness. (2023 11:59)      PAST MEDICAL & SURGICAL HISTORY:  Benign Hypertension (ICD9 401.1)      HLD (Hyperlipidemia) (ICD9 272.4)      Crohn's Disease of Intestine (ICD9 555.9)      Mitral Regurgitation (ICD9 424.0)      Aortic Stenosis (ICD9 424.1)      Chronic Osteoarthritis (ICD9 715.90)      Osteopenia (ICD9 733.90)      C Section (ICD9 669.70)      C Section (ICD9 669.70)      C Section (ICD9 669.70)      C Section (ICD9 669.70)          MEDICATIONS  (STANDING):  cefTRIAXone   IVPB 1000 milliGRAM(s) IV Intermittent every 24 hours  clotrimazole Lozenge 1 Lozenge Oral five times a day  fluconAZOLE IVPB      fluconAZOLE IVPB 100 milliGRAM(s) IV Intermittent every 24 hours  hydrALAZINE 10 milliGRAM(s) Oral three times a day  pantoprazole    Tablet 40 milliGRAM(s) Oral before breakfast  rivaroxaban 10 milliGRAM(s) Oral daily      Allergies    Bacitracin ointment (Rash)  No Known Drug Allergies  latex (Rash)    Intolerances        SOCIAL HISTORY:  Denies ETOh,Smoking,     FAMILY HISTORY:      REVIEW OF SYSTEMS:    CONSTITUTIONAL: No weakness, fevers or chills  EYES/ENT: No visual changes;  No vertigo or throat pain   NECK: No pain or stiffness  RESPIRATORY: No cough, wheezing, hemoptysis; No shortness of breath  CARDIOVASCULAR: No chest pain or palpitations  GASTROINTESTINAL: No abdominal or epigastric pain. No nausea, vomiting, or hematemesis; No diarrhea or constipation. No melena or hematochezia.  GENITOURINARY: No dysuria, frequency or hematuria  NEUROLOGICAL: No numbness or weakness  SKIN: No itching, burning, rashes, or lesions   All other review of systems is negative unless indicated above.    VITAL:  T(C): , Max: 37.2 (23 @ 21:58)  T(F): , Max: 98.9 (23 @ 21:58)  HR: 92 (06-15-23 @ 04:12)  BP: 130/87 (06-15-23 @ 04:12)  BP(mean): --  RR: 18 (06-15-23 @ 04:12)  SpO2: 93% (06-15-23 @ 04:12)  Wt(kg): --    I and O's:     @ 07:  -   @ 07:00  --------------------------------------------------------  IN: 0 mL / OUT: 400 mL / NET: -400 mL     @ 07:  -   @ 07:00  --------------------------------------------------------  IN: 0 mL / OUT: 100 mL / NET: -100 mL     @ 07:  -  15 @ 06:40  --------------------------------------------------------  IN: 930 mL / OUT: 650 mL / NET: 280 mL          PHYSICAL EXAM:    Constitutional: NAD  HEENT: PERRLA,   Neck: No JVD  Respiratory: CTA B/L  Cardiovascular: S1 and S2  Gastrointestinal: BS+, soft, NT/ND  Extremities: No peripheral edema  Neurological: A/O x 3, no focal deficits  Psychiatric: Normal mood, normal affect  : No Bazzi  Skin: No rashes  Access: Not applicable  Back: No CVA tenderness    LABS:                        11.4   5.20  )-----------( 246      ( 2023 07:13 )             33.3     06-14    140  |  103  |  10  ----------------------------<  112<H>  3.9   |  28  |  0.49<L>    Ca    9.1      2023 06:32            RADIOLOGY & ADDITIONAL STUDIES:

## 2023-06-15 NOTE — PROGRESS NOTE ADULT - SUBJECTIVE AND OBJECTIVE BOX
CC: f/u for asp PNA    Patient reports no new c/o    REVIEW OF SYSTEMS: no fevers, no chills, no nausea, no vomiting, no abd pain, no resp symptoms  All other review of systems negative (Comprehensive ROS)    Antimicrobials Day #      cefTRIAXone   IVPB 1000 milliGRAM(s) IV Intermittent every 24 hours  clotrimazole Lozenge 1 Lozenge Oral five times a day  fluconAZOLE IVPB      fluconAZOLE IVPB 100 milliGRAM(s) IV Intermittent every 24 hours          Other Medications Reviewed    Vital Signs Last 24 Hrs  T(C): 37.1 (15 Arthur 2023 04:12), Max: 37.2 (2023 21:58)  T(F): 98.7 (15 Arthur 2023 04:12), Max: 98.9 (2023 21:58)  HR: 92 (15 Arthur 2023 04:12) (86 - 92)  BP: 130/87 (15 Arthur 2023 04:12) (127/80 - 130/87)  BP(mean): --  RR: 18 (15 Arthur 2023 04:12) (18 - 18)  SpO2: 93% (15 Arthur 2023 04:12) (93% - 94%)    Parameters below as of 15 Arthur 2023 04:12  Patient On (Oxygen Delivery Method): nasal cannula  O2 Flow (L/min): 2          PHYSICAL EXAM:  General: alert, no acute distress  Eyes:  anicteric, no conjunctival injection, no discharge  Oropharynx: no lesions or injection 	  Neck: supple, without adenopathy  Lungs: clear to auscultation  Heart: regular rate and rhythm; no murmur, rubs or gallops  Abdomen: soft, nondistended, nontender, without mass or organomegaly  Skin: no lesions  Extremities: no clubbing, cyanosis, or edema  Neurologic: alert, oriented, moves all extremities    LAB RESULTS:                                              12.3   6.76  )-----------( 276      ( 15 Arthur 2023 07:38 )             37.1   06-15    142  |  101  |  7   ----------------------------<  103<H>  3.8   |  30  |  0.58    Ca    9.5      15 Arthur 2023 07:38          Urinalysis Basic - ( 2023 06:37 )    Color: Yellow / Appearance: Turbid / S.044 / pH: x  Gluc: x / Ketone: Trace  / Bili: Negative / Urobili: Negative   Blood: x / Protein: 100 mg/dL / Nitrite: Negative   Leuk Esterase: Negative / RBC: 2 /hpf / WBC 2 /HPF   Sq Epi: x / Non Sq Epi: x / Bacteria: Negative        MICROBIOLOGY REVIEWED:      Culture - Sputum . (23 @ 10:56)   Gram Stain:   Numerous polymorphonuclear leukocytes per low power field   Numerous Squamous epithelial cells per low power field   Few Gram Positive Rods seen per oil power field  Specimen Source: .Sputum Sputum  RADIOLOGY REVIEWED:    < from: CT Chest No Cont (23 @ 01:58) >  IMPRESSION:  Scattered groundglass nodular and patchy opacities as well as clusters of   tree in bud type nodular opacities as described above. This is likely   related to multifocal infection for which both typical and atypical   etiologies should be considered. Pattern of tree-in-bud typelower lobe   nodules may suggest aspiration as well. Correlate clinically.    Bronchitis/bronchiolitis.    Stable ascending aortic dilatation.    Stable 4 mm right upper lobe nodule.    Mild fibrotic changes, stable.    Cardiomegaly and small pericardial effusion.    < end of copied text >

## 2023-06-15 NOTE — PROGRESS NOTE ADULT - SUBJECTIVE AND OBJECTIVE BOX
Patient is a 88y old  Female who presents with a chief complaint of dysphagia (15 Arthur 2023 06:39)      SUBJECTIVE / OVERNIGHT EVENTS: completing ABx today, DC home in am    MEDICATIONS  (STANDING):  clotrimazole Lozenge 1 Lozenge Oral five times a day  fluconAZOLE IVPB      fluconAZOLE IVPB 100 milliGRAM(s) IV Intermittent every 24 hours  hydrALAZINE 10 milliGRAM(s) Oral three times a day  isosorbide   dinitrate Tablet (ISORDIL) 5 milliGRAM(s) Oral three times a day  pantoprazole    Tablet 40 milliGRAM(s) Oral before breakfast  rivaroxaban 10 milliGRAM(s) Oral daily    MEDICATIONS  (PRN):  guaiFENesin Oral Liquid (Sugar-Free) 100 milliGRAM(s) Oral every 6 hours PRN Cough      Vital Signs Last 24 Hrs  T(F): 97.7 (06-15-23 @ 12:04), Max: 98.9 (06-14-23 @ 21:58)  HR: 85 (06-15-23 @ 14:00) (85 - 92)  BP: 123/82 (06-15-23 @ 14:00) (119/81 - 130/87)  RR: 18 (06-15-23 @ 12:04) (18 - 18)  SpO2: 96% (06-15-23 @ 12:04) (93% - 96%)  Telemetry:   CAPILLARY BLOOD GLUCOSE        I&O's Summary    14 Jun 2023 07:01  -  15 Arthur 2023 07:00  --------------------------------------------------------  IN: 930 mL / OUT: 850 mL / NET: 80 mL    15 Arthur 2023 07:01  -  15 Arthur 2023 19:48  --------------------------------------------------------  IN: 0 mL / OUT: 400 mL / NET: -400 mL        PHYSICAL EXAM:  GENERAL: NAD, well-developed  HEAD:  Atraumatic, Normocephalic  EYES: EOMI, PERRLA, conjunctiva and sclera clear  NECK: Supple, No JVD  CHEST/LUNG: Clear to auscultation bilaterally; No wheeze  HEART: Regular rate and rhythm; No murmurs, rubs, or gallops  ABDOMEN: Soft, Nontender, Nondistended; Bowel sounds present  EXTREMITIES:  2+ Peripheral Pulses, No clubbing, cyanosis, or edema  PSYCH: AAOx3  NEUROLOGY: non-focal  SKIN: No rashes or lesions    LABS:                        12.3   6.76  )-----------( 276      ( 15 Arthur 2023 07:38 )             37.1     06-15    142  |  101  |  7   ----------------------------<  103<H>  3.8   |  30  |  0.58    Ca    9.5      15 Arthur 2023 07:38                RADIOLOGY & ADDITIONAL TESTS:    Imaging Personally Reviewed:    Consultant(s) Notes Reviewed:      Care Discussed with Consultants/Other Providers:

## 2023-06-15 NOTE — PROGRESS NOTE ADULT - SUBJECTIVE AND OBJECTIVE BOX
Date of Service: 06-15-23 @ 14:28    Patient is a 88y old  Female who presents with a chief complaint of dysphagia (15 Arthur 2023 06:39)      Any change in ROS: seems pretty good:  aide feeding her:   no sob:  on room air:     MEDICATIONS  (STANDING):  cefTRIAXone   IVPB 1000 milliGRAM(s) IV Intermittent every 24 hours  clotrimazole Lozenge 1 Lozenge Oral five times a day  fluconAZOLE IVPB      fluconAZOLE IVPB 100 milliGRAM(s) IV Intermittent every 24 hours  hydrALAZINE 10 milliGRAM(s) Oral three times a day  pantoprazole    Tablet 40 milliGRAM(s) Oral before breakfast  rivaroxaban 10 milliGRAM(s) Oral daily    MEDICATIONS  (PRN):  guaiFENesin Oral Liquid (Sugar-Free) 100 milliGRAM(s) Oral every 6 hours PRN Cough    Vital Signs Last 24 Hrs  T(C): 36.5 (15 Arthur 2023 12:04), Max: 37.2 (14 Jun 2023 21:58)  T(F): 97.7 (15 Arthur 2023 12:04), Max: 98.9 (14 Jun 2023 21:58)  HR: 85 (15 Arthur 2023 14:00) (85 - 92)  BP: 123/82 (15 Arthur 2023 14:00) (119/81 - 130/87)  BP(mean): --  RR: 18 (15 Arthur 2023 12:04) (18 - 18)  SpO2: 96% (15 Arthur 2023 12:04) (93% - 96%)    Parameters below as of 15 Arthur 2023 12:04  Patient On (Oxygen Delivery Method): nasal cannula  O2 Flow (L/min): 2      I&O's Summary    14 Jun 2023 07:01  -  15 Arthur 2023 07:00  --------------------------------------------------------  IN: 930 mL / OUT: 850 mL / NET: 80 mL          Physical Exam:   GENERAL: NAD, well-groomed, well-developed  HEENT: YANET/   Atraumatic, Normocephalic  ENMT: No tonsillar erythema, exudates, or enlargement; Moist mucous membranes, Good dentition, No lesions  NECK: Supple, No JVD, Normal thyroid  CHEST/LUNG: Clear to auscultaion  CVS: Regular rate and rhythm; No murmurs, rubs, or gallops  GI: : Soft, Nontender, Nondistended; Bowel sounds present  NERVOUS SYSTEM:  Alert & Oriented X3  EXTREMITIES:  2+ Peripheral Pulses, No clubbing, cyanosis, or edema  LYMPH: No lymphadenopathy noted  SKIN: No rashes or lesions  ENDOCRINOLOGY: No Thyromegaly  PSYCH: Appropriate    Labs:                              12.3   6.76  )-----------( 276      ( 15 Arthur 2023 07:38 )             37.1                         11.4   5.20  )-----------( 246      ( 13 Jun 2023 07:13 )             33.3                         10.8   6.17  )-----------( 235      ( 12 Jun 2023 05:54 )             32.3     06-15    142  |  101  |  7   ----------------------------<  103<H>  3.8   |  30  |  0.58  06-14    140  |  103  |  10  ----------------------------<  112<H>  3.9   |  28  |  0.49<L>  06-13    139  |  101  |  16  ----------------------------<  100<H>  3.0<L>   |  28  |  0.48<L>  06-12    140  |  101  |  22  ----------------------------<  85  3.1<L>   |  27  |  0.50    Ca    9.5      15 Arthur 2023 07:38  Ca    9.1      14 Jun 2023 06:32      CAPILLARY BLOOD GLUCOSE                Procalcitonin, Serum: 0.11 ng/mL (06-13 @ 16:24)        RECENT CULTURES:  06-12 @ 10:56 .Sputum Sputum       Numerous polymorphonuclear leukocytes per low power field  Numerous Squamous epithelial cells per low power field  Few Gram Positive Rods seen per oil power field           Normal Respiratory Lu present    06-11 @ 01:13 Clean Catch Clean Catch (Midstream)       rd< from: CT Chest No Cont (06.11.23 @ 01:58) >  peripheral reticular densities with mid to upper lobe predominance is   unchanged. No pleural effusion.    UPPER ABDOMEN: Cholelithiasis.    BONES/SOFT TISSUES: Degenerative changes. Compression deformities of T7,   and T9 are unchanged from 12/6/2022. T12 compression deformity is also   appreciated. This was not imaged on the previous exam.    IMPRESSION:  Scattered groundglass nodular and patchy opacities as well as clusters of   tree in bud type nodular opacities as described above. This is likely   related to multifocal infection for which both typical and atypical   etiologies should be considered. Pattern of tree-in-bud typelower lobe   nodules may suggest aspiration as well. Correlate clinically.    Bronchitis/bronchiolitis.    Stable ascending aortic dilatation.    Stable 4 mm right upper lobe nodule.    Mild fibrotic changes, stable.    Cardiomegaly and small pericardial effusion.    --- End of Report ---           NAMAN STAFFORD MD; Resident Radiologist  This document has been electronically signed.    < end of copied text >           <10,000 CFU/mL Normal Urogenital Lu    06-10 @ 21:32 .Blood Blood-Peripheral                No growth to date.    06-10 @ 20:58 .Blood Blood-Peripheral                No growth to date.          RESPIRATORY CULTURES:          Studies  Chest X-RAY  CT SCAN Chest   Venous Dopplers: LE:   CT Abdomen  Others

## 2023-06-15 NOTE — PROGRESS NOTE ADULT - ASSESSMENT
89 yo woman with ibd, has developed thrush and was given po diflucan but despite this she has ongoing dysphagia and gurgling. CT suggest aspiration and poor pulmonary toilet. ENT here shows some mild thrush but vocal cords work ok and no other pathology.  Suspect she has aspiration pneumonia with partially treated thrush.   Unclear if the dysphagia was caused by thrush alone or if she has some other cause.   No recent hospitalizations  sputum --normal resp tiarra  Recommendations:  on iv diflucan for now while dysphagia w/u in progress, can switch to po diflucan  cover pneumonia with ceftriaxone for now day 6/7, can switch to PO ceftin  dc planning

## 2023-06-15 NOTE — PROGRESS NOTE ADULT - SUBJECTIVE AND OBJECTIVE BOX
CARDIOLOGY FOLLOW UP - Dr. Tafoya  DATE OF SERVICE: 6/15/23    CC  No CV complaints    REVIEW OF SYSTEMS:  CONSTITUTIONAL: No fever, weight loss, or fatigue  RESPIRATORY: No cough, wheezing, chills or hemoptysis; No Shortness of Breath  CARDIOVASCULAR: No chest pain, palpitations, passing out, dizziness, or leg swelling  GASTROINTESTINAL: No abdominal or epigastric pain. No nausea, vomiting, or hematemesis; No diarrhea or constipation. No melena or hematochezia.  VASCULAR: No edema     PHYSICAL EXAM:  T(C): 37.1 (06-15-23 @ 04:12), Max: 37.2 (06-14-23 @ 21:58)  HR: 92 (06-15-23 @ 04:12) (86 - 92)  BP: 130/87 (06-15-23 @ 04:12) (127/80 - 130/87)  RR: 18 (06-15-23 @ 04:12) (18 - 18)  SpO2: 93% (06-15-23 @ 04:12) (93% - 94%)  Wt(kg): --  I&O's Summary    14 Jun 2023 07:01  -  15 Arthur 2023 07:00  --------------------------------------------------------  IN: 930 mL / OUT: 850 mL / NET: 80 mL        Appearance: Elderly female	  Cardiovascular: Normal S1 S2,RRR, No JVD, No murmurs  Respiratory: Lungs clear to auscultation b/l 	  Gastrointestinal:  Soft, Non-tender, + BS	  Extremities: Normal range of motion, No clubbing, cyanosis or edema      Home Medications:  fluconazole 100 mg oral tablet: 2 tab(s) orally for 1 day then 1 tab once a day for 9 days (11 Jun 2023 10:18)  losartan 25 mg oral tablet: 1 tab(s) orally once a day (11 Jun 2023 10:18)  mercaptopurine 50 mg oral tablet: 1 tab(s) orally once a day (11 Jun 2023 10:18)  mesalamine 500 mg oral capsule, extended release: 2 cap(s) orally 4 times a day (11 Jun 2023 10:18)      MEDICATIONS  (STANDING):  cefTRIAXone   IVPB 1000 milliGRAM(s) IV Intermittent every 24 hours  clotrimazole Lozenge 1 Lozenge Oral five times a day  fluconAZOLE IVPB      fluconAZOLE IVPB 100 milliGRAM(s) IV Intermittent every 24 hours  hydrALAZINE 10 milliGRAM(s) Oral three times a day  pantoprazole    Tablet 40 milliGRAM(s) Oral before breakfast  rivaroxaban 10 milliGRAM(s) Oral daily      TELEMETRY: 	    ECG:  	  RADIOLOGY:   DIAGNOSTIC TESTING:  [x] Echocardiogram:  < from: TTE W or WO Ultrasound Enhancing Agent (06.14.23 @ 12:22) >  CONCLUSIONS:      1. Normal left ventricular cavity size. The left ventricular wall thickness is normal. The left ventricular systolic function is normal with an ejection fraction of 75 % by Sorto's method of disks. There are no regional wall motion abnormalities seen.   2. Normal right ventricular cavity size, normal wall thickness and normal systolic function.   3. Severe aortic stenosis.   4. Severe mitral regurgitation.   5. Mild to moderate pulmonary hypertension.    < end of copied text >    [ ]  Catheterization:  [ ] Stress Test:    OTHER: 	    LABS:	 	                            12.3   6.76  )-----------( 276      ( 15 Arthur 2023 07:38 )             37.1     06-15    142  |  101  |  7   ----------------------------<  103<H>  3.8   |  30  |  0.58    Ca    9.5      15 Arthur 2023 07:38

## 2023-06-15 NOTE — DISCHARGE NOTE PROVIDER - CARE PROVIDER_API CALL
Dave Bowden  Cardiovascular Disease  [address], [address]  [place] [address]  Phone: [phone]  Fax: [phone]  Follow Up Time: 1 week    Deniz Eden  Pulmonary Disease  [address] [place] [address]  Phone: [phone]  Fax: [phone]  Follow Up Time:    Dave Bowden  Cardiovascular Disease  [address], [address]  [place] [address]  Phone: [phone]  Fax: [phone]  Established Patient  Follow Up Time: 1 week    Deniz Eden  Pulmonary Disease  [address] [place] [address]  Phone: [phone]  Fax: [phone]  Follow Up Time:     Chelsea Lomeli  Internal Medicine  [address] [place] [address], [place] [address]  Phone: [phone]  Fax: [phone]  Follow Up Time: 1 week

## 2023-06-15 NOTE — PROGRESS NOTE ADULT - ASSESSMENT
Echo 6/14/23: Nml lV fxn EF 75%, Severe AS, Severe MR, mild-mod pulm htn    A/P  88F with PMH/PSH including HTN, Aortic Stenosis, Vocal cord paralysis and dysphagia presents to the ED with one week of "thrush" and difficulty swallowing.         #Aortic Stenosis  -Echo with severe AS  -No evidence of overload on exam or imaging  -Will hold off on starting lasix as pt with poor po intake  -Pt is not interested in surgery or discussion w/ structural  -I discussed the above w/ patients daughter, who is in agreement  -Pt & daughter understand if pt has sx of fluid overload (SOB, LE swelling, SANTOYO) to come to f/u with cardiologist or come to ED    #Mitral Regurgitation  -Severe as seen on echo  -No fluid overload  -Mgmt of MR as above    #Aortic Aneurysm  -3.7cm, Stable on CT  -No reports of cp, back pain, sob    #Candidal Esophagitis  -Fluconazole per id  -Mgmt per med, GI    #Dysphagia  -CT w/ concern for aspiration pna  -W/u per med, gi, neuro  -Abx per id        Please call/text me with questions/concerns between 8am-4pm  [phone]

## 2023-06-16 ENCOUNTER — TRANSCRIPTION ENCOUNTER (OUTPATIENT)
Age: 88
End: 2023-06-16

## 2023-06-16 VITALS
RESPIRATION RATE: 18 BRPM | HEART RATE: 96 BPM | SYSTOLIC BLOOD PRESSURE: 126 MMHG | OXYGEN SATURATION: 95 % | DIASTOLIC BLOOD PRESSURE: 76 MMHG | TEMPERATURE: 98 F

## 2023-06-16 LAB
CULTURE RESULTS: SIGNIFICANT CHANGE UP
CULTURE RESULTS: SIGNIFICANT CHANGE UP
SPECIMEN SOURCE: SIGNIFICANT CHANGE UP
SPECIMEN SOURCE: SIGNIFICANT CHANGE UP

## 2023-06-16 PROCEDURE — 96375 TX/PRO/DX INJ NEW DRUG ADDON: CPT

## 2023-06-16 PROCEDURE — 85027 COMPLETE CBC AUTOMATED: CPT

## 2023-06-16 PROCEDURE — 96374 THER/PROPH/DIAG INJ IV PUSH: CPT

## 2023-06-16 PROCEDURE — 36600 WITHDRAWAL OF ARTERIAL BLOOD: CPT

## 2023-06-16 PROCEDURE — 80053 COMPREHEN METABOLIC PANEL: CPT

## 2023-06-16 PROCEDURE — 92526 ORAL FUNCTION THERAPY: CPT

## 2023-06-16 PROCEDURE — 99285 EMERGENCY DEPT VISIT HI MDM: CPT | Mod: 25

## 2023-06-16 PROCEDURE — 71250 CT THORAX DX C-: CPT | Mod: MA

## 2023-06-16 PROCEDURE — 82803 BLOOD GASES ANY COMBINATION: CPT

## 2023-06-16 PROCEDURE — 87449 NOS EACH ORGANISM AG IA: CPT

## 2023-06-16 PROCEDURE — 84145 PROCALCITONIN (PCT): CPT

## 2023-06-16 PROCEDURE — 36415 COLL VENOUS BLD VENIPUNCTURE: CPT

## 2023-06-16 PROCEDURE — 87641 MR-STAPH DNA AMP PROBE: CPT

## 2023-06-16 PROCEDURE — 92610 EVALUATE SWALLOWING FUNCTION: CPT

## 2023-06-16 PROCEDURE — 86703 HIV-1/HIV-2 1 RESULT ANTBDY: CPT

## 2023-06-16 PROCEDURE — 97116 GAIT TRAINING THERAPY: CPT

## 2023-06-16 PROCEDURE — 87040 BLOOD CULTURE FOR BACTERIA: CPT

## 2023-06-16 PROCEDURE — 97162 PT EVAL MOD COMPLEX 30 MIN: CPT

## 2023-06-16 PROCEDURE — 87086 URINE CULTURE/COLONY COUNT: CPT

## 2023-06-16 PROCEDURE — 92611 MOTION FLUOROSCOPY/SWALLOW: CPT

## 2023-06-16 PROCEDURE — 87070 CULTURE OTHR SPECIMN AEROBIC: CPT

## 2023-06-16 PROCEDURE — 85025 COMPLETE CBC W/AUTO DIFF WBC: CPT

## 2023-06-16 PROCEDURE — 87640 STAPH A DNA AMP PROBE: CPT

## 2023-06-16 PROCEDURE — 70450 CT HEAD/BRAIN W/O DYE: CPT

## 2023-06-16 PROCEDURE — 81001 URINALYSIS AUTO W/SCOPE: CPT

## 2023-06-16 PROCEDURE — 93306 TTE W/DOPPLER COMPLETE: CPT

## 2023-06-16 PROCEDURE — 97530 THERAPEUTIC ACTIVITIES: CPT

## 2023-06-16 PROCEDURE — 83880 ASSAY OF NATRIURETIC PEPTIDE: CPT

## 2023-06-16 PROCEDURE — 71045 X-RAY EXAM CHEST 1 VIEW: CPT

## 2023-06-16 PROCEDURE — 74230 X-RAY XM SWLNG FUNCJ C+: CPT

## 2023-06-16 PROCEDURE — 80048 BASIC METABOLIC PNL TOTAL CA: CPT

## 2023-06-16 RX ORDER — ISOSORBIDE DINITRATE 5 MG/1
1 TABLET ORAL
Qty: 90 | Refills: 0
Start: 2023-06-16 | End: 2023-07-15

## 2023-06-16 RX ORDER — LOSARTAN POTASSIUM 100 MG/1
1 TABLET, FILM COATED ORAL
Refills: 0 | DISCHARGE

## 2023-06-16 RX ORDER — HYDRALAZINE HCL 50 MG
1 TABLET ORAL
Qty: 90 | Refills: 0
Start: 2023-06-16 | End: 2023-07-15

## 2023-06-16 RX ADMIN — PANTOPRAZOLE SODIUM 40 MILLIGRAM(S): 20 TABLET, DELAYED RELEASE ORAL at 06:14

## 2023-06-16 RX ADMIN — RIVAROXABAN 10 MILLIGRAM(S): KIT at 11:43

## 2023-06-16 RX ADMIN — ISOSORBIDE DINITRATE 5 MILLIGRAM(S): 5 TABLET ORAL at 11:42

## 2023-06-16 RX ADMIN — Medication 1 LOZENGE: at 08:34

## 2023-06-16 RX ADMIN — ISOSORBIDE DINITRATE 5 MILLIGRAM(S): 5 TABLET ORAL at 06:14

## 2023-06-16 RX ADMIN — Medication 1 LOZENGE: at 11:43

## 2023-06-16 RX ADMIN — Medication 10 MILLIGRAM(S): at 14:12

## 2023-06-16 RX ADMIN — Medication 1 LOZENGE: at 00:02

## 2023-06-16 RX ADMIN — FLUCONAZOLE 50 MILLIGRAM(S): 150 TABLET ORAL at 11:44

## 2023-06-16 RX ADMIN — Medication 10 MILLIGRAM(S): at 06:14

## 2023-06-16 RX ADMIN — ISOSORBIDE DINITRATE 5 MILLIGRAM(S): 5 TABLET ORAL at 17:14

## 2023-06-16 NOTE — PROGRESS NOTE ADULT - SUBJECTIVE AND OBJECTIVE BOX
Date of Service: 06-16-23 @ 15:48    Patient is a 88y old  Female who presents with a chief complaint of dysphagia (16 Jun 2023 06:20)      Any change in ROS:   No new respiratory events overnight. Denies SOB/CP.      MEDICATIONS  (STANDING):  fluconAZOLE IVPB      fluconAZOLE IVPB 100 milliGRAM(s) IV Intermittent every 24 hours  hydrALAZINE 10 milliGRAM(s) Oral three times a day  isosorbide   dinitrate Tablet (ISORDIL) 5 milliGRAM(s) Oral three times a day  pantoprazole    Tablet 40 milliGRAM(s) Oral before breakfast  rivaroxaban 10 milliGRAM(s) Oral daily    MEDICATIONS  (PRN):  guaiFENesin Oral Liquid (Sugar-Free) 100 milliGRAM(s) Oral every 6 hours PRN Cough    Vital Signs Last 24 Hrs  T(C): 36.4 (16 Jun 2023 11:44), Max: 36.8 (15 Arthur 2023 21:54)  T(F): 97.6 (16 Jun 2023 11:44), Max: 98.3 (15 Arthur 2023 21:54)  HR: 92 (16 Jun 2023 11:44) (75 - 92)  BP: 125/84 (16 Jun 2023 11:44) (107/73 - 131/71)  BP(mean): --  RR: 18 (16 Jun 2023 13:35) (18 - 18)  SpO2: 93% (16 Jun 2023 13:35) (93% - 98%)    Parameters below as of 16 Jun 2023 13:35  Patient On (Oxygen Delivery Method): room air        I&O's Summary    15 Arthur 2023 07:01  -  16 Jun 2023 07:00  --------------------------------------------------------  IN: 0 mL / OUT: 600 mL / NET: -600 mL    16 Jun 2023 07:01  -  16 Jun 2023 15:48  --------------------------------------------------------  IN: 0 mL / OUT: 100 mL / NET: -100 mL          Physical Exam:   GENERAL: NAD  HEENT: YANET  ENMT: No tonsillar erythema, exudates, or enlargement  NECK: Supple, No JVD  CHEST/LUNG: CTA b/l   CVS: Regular rate and rhythm  GI: : Soft, Nontender, Nondistended  NERVOUS SYSTEM:  Alert & Oriented X3  EXTREMITIES:  2+ Peripheral Pulses, No clubbing, cyanosis, or edema  SKIN: No rashes or lesions  PSYCH: Appropriate    Labs:                              12.3   6.76  )-----------( 276      ( 15 Arthur 2023 07:38 )             37.1                         11.4   5.20  )-----------( 246      ( 13 Jun 2023 07:13 )             33.3     06-15    142  |  101  |  7   ----------------------------<  103<H>  3.8   |  30  |  0.58  06-14    140  |  103  |  10  ----------------------------<  112<H>  3.9   |  28  |  0.49<L>  06-13    139  |  101  |  16  ----------------------------<  100<H>  3.0<L>   |  28  |  0.48<L>    Ca    9.5      15 Arthur 2023 07:38      CAPILLARY BLOOD GLUCOSE                Procalcitonin, Serum: 0.11 ng/mL (06-13 @ 16:24)        RECENT CULTURES:  06-12 @ 10:56 .Sputum Sputum       Numerous polymorphonuclear leukocytes per low power field  Numerous Squamous epithelial cells per low power field  Few Gram Positive Rods seen per oil power field           Normal Respiratory Lu present    06-11 @ 01:13 Clean Catch Clean Catch (Midstream)                <10,000 CFU/mL Normal Urogenital Lu    06-10 @ 21:32 .Blood Blood-Peripheral                No Growth Final    06-10 @ 20:58 .Blood Blood-Peripheral                No Growth Final          Studies  ct< from: CT Chest No Cont (06.11.23 @ 01:58) >  FINDINGS:    LYMPH NODES: No lymphadenopathy.    HEART/VASCULATURE:  Heart size is enlarged.Small pericardial effusion.. Ectatic ascending   aorta measuring 3.7 cm is unchanged from 12/6/2022.  There are aortic,   valvular and coronary artery calcifications.    AIRWAYS/LUNGS/PLEURA: Layering secretions within the trachea. Thickening   of the bronchial walls. Scattered bilateral groundglass nodular patchy   opacities, left greater than right. Additional clusters of solid nodules   are seen within the bilateral lower lobes with tree-in-bud distribution,   likely related to small airways disease. Separate right upper lobe nodule   measures 0.4 cm (3:193) is unchanged from prior. Additional bilateral   peripheral reticular densities with mid to upper lobe predominance is   unchanged. No pleural effusion.    UPPER ABDOMEN: Cholelithiasis.    BONES/SOFT TISSUES: Degenerative changes. Compression deformities of T7,   and T9 are unchanged from 12/6/2022. T12 compression deformity is also   appreciated. This was not imaged on the previous exam.    IMPRESSION:  Scattered groundglass nodular and patchy opacities as well as clusters of   tree in bud type nodular opacities as described above. This is likely   related to multifocal infection for which both typical and atypical   etiologies should be considered. Pattern of tree-in-bud typelower lobe   nodules may suggest aspiration as well. Correlate clinically.    Bronchitis/bronchiolitis.    Stable ascending aortic dilatation.    Stable 4 mm right upper lobe nodule.    Mild fibrotic changes, stable.    Cardiomegaly and small pericardial effusion.    --- End of Report ---    < end of copied text >

## 2023-06-16 NOTE — DISCHARGE NOTE NURSING/CASE MANAGEMENT/SOCIAL WORK - PATIENT PORTAL LINK FT
You can access the FollowMyHealth Patient Portal offered by [facility] by registering at the following website: [email or web address]. By joining [email or web address] FollowMyHealth portal, you will also be able to view your health information using other applications (apps) compatible with our system.

## 2023-06-16 NOTE — PROGRESS NOTE ADULT - ASSESSMENT
1) aspiration pneumonai  2) speech and swallow pappreciated  3) would not peg  4) conservativce mgt  5) abx per id   6) goc based on age and comorbidies

## 2023-06-16 NOTE — PROGRESS NOTE ADULT - ASSESSMENT
88F with PMH/PSH including HTN, Aortic Stenosis,  Vocal cord paralysis and dysphagia presents to the ED with one week of "thrush" and difficulty swallowing.   Daughter states ptn sounds " junky" when she is breathing and talking. Patient was able to spell her name and provide her .  Daughter reports ptn was Rx'ed w Fluconazole and Mercaptopurine by ENT.  Denies previous episode of thrush.  Denies immunosuppressant, history of HIV.  Reports since then has not really improved, Ptn has had minimal po intake since 23.  Reports pain with swallowing and while interview has been spitting into emesis bag.  Placed on O2 by EMS, denies use at home.  Denies chest pain, shortness of breath, abdominal pain, nausea, vomiting, diarrhea, urinary complaints, fevers, chills, recent illness.    A/P  Aspiration PNA: seen by ID and pulm.   on dysphagia 2 diet w mod thickened liquids, this was recommended after MBS on   Thrush: seen by ent PTA, will cont Diflucan x 1 week, ENT eval here DONE: no vocal cord paralysis was observed.   Ascending Aortic Aneurism   TTE: severe AS, severe MR, mod pulm HTN  cont  Hydralazine 10 mg tid and  Isordil 5 mg tid  Volume depletion resolved post IVF, presently euvolemic  Dysphagia: cont on dysphagia diet  HTN: cont hydralazine  and  isordil as above  Dispo:  pulse on OX ra while ambulating w PT: 95%.dc home w outptn home PT  DVT ppx w po xarelto  DC planning home

## 2023-06-16 NOTE — PROGRESS NOTE ADULT - TIME BILLING
agree with the above assessment and plan by ACP  cv stable  f/u echo  Mgmt of esophagitia/dysphagia per med, GI
agree with the above assessment and plan by ACP  cv stable  echo severe AS/MR  no CHF  Mgmt of esophagitis/dysphagia per med, GI  defer structural eval for now
agree with the above assessment and plan by ACP  cv stable  echo severe AS/MR  no CHF  Mgmt of esophagitis/dysphagia per med, GI  defer structural eval for now
agree with the above assessment and plan by ACP  cv stable  f/u echo  Mgmt of esophagitia/dysphagia per med, GI

## 2023-06-16 NOTE — PROGRESS NOTE ADULT - SUBJECTIVE AND OBJECTIVE BOX
CC: f/u for asp PNA    Patient reports no new c/o, feels better    REVIEW OF SYSTEMS: no fevers, no chills, no nausea, no vomiting, no abd pain, no resp symptoms  All other review of systems negative (Comprehensive ROS)    Antimicrobials Day #    clotrimazole Lozenge 1 Lozenge Oral five times a day  fluconAZOLE IVPB      fluconAZOLE IVPB 100 milliGRAM(s) IV Intermittent every 24 hours          Other Medications Reviewed    Vital Signs Last 24 Hrs  T(C): 36.6 (2023 05:29), Max: 36.8 (15 Arthur 2023 21:54)  T(F): 97.9 (2023 05:29), Max: 98.3 (15 Arthur 2023 21:54)  HR: 75 (2023 05:29) (75 - 86)  BP: 131/71 (2023 05:29) (107/73 - 131/71)  BP(mean): --  RR: 18 (2023 05:29) (18 - 18)  SpO2: 98% (2023 05:29) (93% - 98%)    Parameters below as of 2023 05:29  Patient On (Oxygen Delivery Method): nasal cannula  O2 Flow (L/min): 2          PHYSICAL EXAM:  General: alert, no acute distress  Eyes:  anicteric, no conjunctival injection, no discharge  Oropharynx: no lesions or injection 	  Neck: supple, without adenopathy  Lungs: clear to auscultation  Heart: regular rate and rhythm; no murmur, rubs or gallops  Abdomen: soft, nondistended, nontender, without mass or organomegaly  Skin: no lesions  Extremities: no clubbing, cyanosis, or edema  Neurologic: alert, oriented, moves all extremities    LAB RESULTS:                                              12.3   6.76  )-----------( 276      ( 15 Arthur 2023 07:38 )             37.1   06-15    142  |  101  |  7   ----------------------------<  103<H>  3.8   |  30  |  0.58    Ca    9.5      15 Arthur 2023 07:38          Urinalysis Basic - ( 2023 06:37 )    Color: Yellow / Appearance: Turbid / S.044 / pH: x  Gluc: x / Ketone: Trace  / Bili: Negative / Urobili: Negative   Blood: x / Protein: 100 mg/dL / Nitrite: Negative   Leuk Esterase: Negative / RBC: 2 /hpf / WBC 2 /HPF   Sq Epi: x / Non Sq Epi: x / Bacteria: Negative        MICROBIOLOGY REVIEWED:  Culture - Sputum . (23 @ 10:56)   Gram Stain:   Numerous polymorphonuclear leukocytes per low power field   Numerous Squamous epithelial cells per low power field   Few Gram Positive Rods seen per oil power field  Specimen Source: .Sputum Sputum  RADIOLOGY REVIEWED:    < from: CT Chest No Cont (23 @ 01:58) >  IMPRESSION:  Scattered groundglass nodular and patchy opacities as well as clusters of   tree in bud type nodular opacities as described above. This is likely   related to multifocal infection for which both typical and atypical   etiologies should be considered. Pattern of tree-in-bud typelower lobe   nodules may suggest aspiration as well. Correlate clinically.    Bronchitis/bronchiolitis.    Stable ascending aortic dilatation.    Stable 4 mm right upper lobe nodule.    Mild fibrotic changes, stable.    Cardiomegaly and small pericardial effusion.    < end of copied text >

## 2023-06-16 NOTE — PROGRESS NOTE ADULT - PROVIDER SPECIALTY LIST ADULT
Cardiology
Gastroenterology
Infectious Disease
Internal Medicine
Pulmonology
Cardiology
Gastroenterology
Infectious Disease
Internal Medicine
Internal Medicine
Cardiology
Cardiology
Gastroenterology
Infectious Disease
Internal Medicine
Internal Medicine
Pulmonology
Gastroenterology

## 2023-06-16 NOTE — PROGRESS NOTE ADULT - ASSESSMENT
Echo 6/14/23: Nml lV fxn EF 75%, Severe AS, Severe MR, mild-mod pulm htn    A/P  88F with PMH/PSH including HTN, Aortic Stenosis, Vocal cord paralysis and dysphagia presents to the ED with one week of "thrush" and difficulty swallowing.         #Aortic Stenosis  -Echo with severe AS  -No evidence of overload on exam or imaging  -Will hold off on starting lasix as pt with poor po intake  -Pt is not interested in surgery or discussion w/ structural, therefore will defer structural eval for now  -I discussed the above w/ patients daughter, who is in agreement  -Pt & daughter understand if pt has sx of fluid overload (SOB, LE swelling, SANTOYO) to come to f/u with cardiologist or come to ED    #Mitral Regurgitation  -Severe as seen on echo  -No fluid overload  -Mgmt of MR as above    #Aortic Aneurysm  -3.7cm, Stable on CT  -No reports of cp, back pain, sob    #Candidal Esophagitis  -Fluconazole per id  -Mgmt per med, GI    #Dysphagia  -CT w/ concern for aspiration pna  -W/u per med, gi, neuro  -Abx per id        Please call/text me with questions/concerns between 8am-4pm  [phone]

## 2023-06-16 NOTE — PROGRESS NOTE ADULT - ASSESSMENT
88F with PMH/PSH including HTN, Aortic Stenosis,  Vocal cord paralysis and dysphagia presents to the ED with one week of "thrush" and difficulty swallowing.   Daughter states ptn sounds " junky" when she is breathing and talking. Patient was able to spell her name and provide her .  Daughter reports ptn was Rx'ed w Fluconazole and Mercaptopurine by ENT.  Denies previous episode of thrush.  Denies immunosuppressant, history of HIV.  Reports since then has not really improved, Ptn has had minimal po intake since 23.  Reports pain with swallowing and while interview has been spitting into emesis bag.  Placed on O2 by EMS, denies use at home.  Denies chest pain, shortness of breath, abdominal pain, nausea, vomiting, diarrhea, urinary complaints, fevers, chills, recent illness.    A/P  Aspiration PNA:   Thrush: seen by ent PTA, will cont Diflucan, get ENT eval here to address dysphagia and Vocal cord paralysis  Ascending Aortic Aneurism and h/o AS:   Dysphagia: place on dysphagia diet  HTN:   DVT      :    Aspiration PNA: on zosyn: no gross consolidation: wbc is normal :;  no fever: check procal:  ? short course of antibiotics'   Pulmonary fibrosis: She seems to have pulm fibrosis:  it was seen last time in 2022: there was no follow up after that  : I am not sure if pts family is aware about it:  recent ct scan showed: pulm fibrosis again : will dw family and will need to decide about her home oxygen requirement: check abg: in am   Thrush: seen by ent PTA, will cont Diflucan, get ENT eval here to address dysphagia and Vocal cord paralysis  Ascending Aortic Aneurism and h/o AS: echo awaited  Dysphagia: place on dysphagia diet  HTN:  controlled  DVT ; on xarelto    dw acp    612:    Aspiration PNA: on ceftriaxone todauy : no gross consolidation: wbc is normal :;  no fever: check procal:not done yet:    Pulmonary fibrosis: She seems to have pulm fibrosis:  it was seen last time in 2022: there was no follow up after that  : I am not sure if pts family is aware about it:  recent ct scan showed: pulm fibrosis again : will dw family and will need to decide about her home oxygen requirement: her abg today is with metabolic alkalosis:   Thrush: seen by ent PTA, will cont Diflucan, ent eval done: follow recommendations  Ascending Aortic Aneurism and h/o AS: echo awaited :probnp is pretty  high   Dysphagia:  on dysphagia diet  HTN:  controlled  DVT ; on xarelto    dw acp  and daughter at bedside    :    Aspiration PNA: on ceftriaxone day 4/7 : no gross consolidation: wbc is normal :; ordered procal  Pulmonary fibrosis: She seems to have pulm fibrosis:  it was seen last time in 2022: there was no follow up after that  : I am not sure if pts family is aware about it:  recent ct scan showed: pulm fibrosis again : will dw family and will need to decide about her home oxygen requirement: her abg today is with metabolic alkalosis:   Thrush: seen by ent PTA, will cont Diflucan, ent eval done: follow recommendations  Pulmonary nodule: Stable 4 mm right upper lobe nodule. can be followed up as anoutpt  Ascending Aortic Aneurism and h/o AS: echo awaited :probnp is pretty  high   Dysphagia:  on dysphagia diet  HTN:  controlled  DVT ; on xarelto    dw acp  and son and dil at bedside    :    Aspiration PNA: on ceftriaxone day /7 : no gross consolidation: wbc is normal :; procal 0.11  Pulmonary fibrosis: She seems to have pulm fibrosis:  it was seen last time in 2022: there was no follow up after that  : I am not sure if pts family is aware about it:  recent ct scan showed: pulm fibrosis again : will dw family and will need to decide about her home oxygen requirement: her abg today is with metabolic alkalosis:   Thrush: seen by ent PTA, will cont Diflucan, ent eval done: follow recommendations  Pulmonary nodule: Stable 4 mm right upper lobe nodule. can be followed up as anoutpt  Ascending Aortic Aneurism and h/o AS: echo awaited :probnp is pretty  high   Dysphagia:  on dysphagia diet  HTN:  controlled  DVT ; on xarelto    dw acp  and son and dil at bedside    6/15:    Aspiration PNA: on ceftriaxone day 67 : no gross consolidation: wbc is normal :; procal 0.11  Pulmonary fibrosis: She seems to have pulm fibrosis:  it was seen last time in 2022: the daughter is now aware of the pulm fibrosis  Thrush: seen by ent PTA, will cont Diflucan, ent eval done: follow recommendations  Pulmonary nodule: Stable 4 mm right upper lobe nodule. can be followed up as anoutpt  Ascending Aortic Aneurism and h/o AS: echo awaited :probnp is pretty  high   Dysphagia:  on dysphagia diet  HTN:  controlled  DVT ; on xarelto    dw acp  and son and dil at bedside      Aspiration PNA: s/p ABX   Pulmonary fibrosis: She seems to have pulm fibrosis:  it was seen last time in 2022: d/w family. Denies SOB, breathing comfortably on RA  Thrush: ENT f/u, continue Diflucan  Pulmonary nodule: Stable 4 mm right upper lobe nodule. can be followed up as anoutpt  Ascending Aortic Aneurism and h/o AS: per hx, cards f/u   Dysphagia:  on dysphagia diet. Aspiration precautions  HTN:  controlled  DVT ppx; on xarelto    D/c planning per primary team

## 2023-06-16 NOTE — PROGRESS NOTE ADULT - SUBJECTIVE AND OBJECTIVE BOX
Patient is a 88y old  Female who presents with a chief complaint of dysphagia (16 Jun 2023 06:20)      SUBJECTIVE / OVERNIGHT EVENTS: no new  complains. pulse on OX ra while ambulating w PT: 95%. completed course of ABx for aspiration PNA, completing fluconazole for thrush, tomorrow is the last day. cont isordil and hydralazine.     MEDICATIONS  (STANDING):  fluconAZOLE IVPB      fluconAZOLE IVPB 100 milliGRAM(s) IV Intermittent every 24 hours  hydrALAZINE 10 milliGRAM(s) Oral three times a day  isosorbide   dinitrate Tablet (ISORDIL) 5 milliGRAM(s) Oral three times a day  pantoprazole    Tablet 40 milliGRAM(s) Oral before breakfast  rivaroxaban 10 milliGRAM(s) Oral daily    MEDICATIONS  (PRN):  guaiFENesin Oral Liquid (Sugar-Free) 100 milliGRAM(s) Oral every 6 hours PRN Cough      Vital Signs Last 24 Hrs  T(F): 97.6 (06-16-23 @ 11:44), Max: 98.3 (06-15-23 @ 21:54)  HR: 92 (06-16-23 @ 11:44) (75 - 92)  BP: 125/84 (06-16-23 @ 11:44) (107/73 - 131/71)  RR: 18 (06-16-23 @ 13:35) (18 - 18)  SpO2: 93% (06-16-23 @ 13:35) (93% - 98%)  Telemetry:   CAPILLARY BLOOD GLUCOSE        I&O's Summary    15 Arthur 2023 07:01  -  16 Jun 2023 07:00  --------------------------------------------------------  IN: 0 mL / OUT: 600 mL / NET: -600 mL    16 Jun 2023 07:01  -  16 Jun 2023 16:59  --------------------------------------------------------  IN: 0 mL / OUT: 100 mL / NET: -100 mL        PHYSICAL EXAM:  GENERAL: NAD, well-developed  HEAD:  Atraumatic, Normocephalic  EYES: EOMI, PERRLA, conjunctiva and sclera clear  NECK: Supple, No JVD  CHEST/LUNG: Clear to auscultation bilaterally; No wheeze  HEART: Regular rate and rhythm; No murmurs, rubs, or gallops  ABDOMEN: Soft, Nontender, Nondistended; Bowel sounds present  EXTREMITIES:  2+ Peripheral Pulses, No clubbing, cyanosis, or edema  PSYCH: AAOx3  NEUROLOGY: non-focal  SKIN: No rashes or lesions    LABS:                        12.3   6.76  )-----------( 276      ( 15 Arthur 2023 07:38 )             37.1     06-15    142  |  101  |  7   ----------------------------<  103<H>  3.8   |  30  |  0.58    Ca    9.5      15 Arthur 2023 07:38                RADIOLOGY & ADDITIONAL TESTS:    Imaging Personally Reviewed:    Consultant(s) Notes Reviewed:      Care Discussed with Consultants/Other Providers:

## 2023-06-16 NOTE — PROGRESS NOTE ADULT - SUBJECTIVE AND OBJECTIVE BOX
Patient is a 88y Female     Patient is a 88y old  Female who presents with a chief complaint of dysphagia (15 Arthur 2023 06:39)      HPI:  88F with PMH/PSH including HTN, Aortic Stenosis,  Vocal cord paralysis and dysphagia presents to the ED with one week of "thrush" and difficulty swallowing.   Daughter states ptn sounds " junky" when she is breathing and talking. Patient was able to spell her name and provide her .  Daughter reports ptn was Rx'ed w Fluconazole and Mercaptopurine by ENT.  Denies previous episode of thrush.  Denies immunosuppressant, history of HIV.  Reports since then has not really improved, Ptn has had minimal po intake since 23.  Reports pain with swallowing and while interview has been spitting into emesis bag.  Placed on O2 by EMS, denies use at home.  Denies chest pain, shortness of breath, abdominal pain, nausea, vomiting, diarrhea, urinary complaints, fevers, chills, recent illness. (2023 11:59)      PAST MEDICAL & SURGICAL HISTORY:  Benign Hypertension (ICD9 401.1)      HLD (Hyperlipidemia) (ICD9 272.4)      Crohn's Disease of Intestine (ICD9 555.9)      Mitral Regurgitation (ICD9 424.0)      Aortic Stenosis (ICD9 424.1)      Chronic Osteoarthritis (ICD9 715.90)      Osteopenia (ICD9 733.90)      C Section (ICD9 669.70)      C Section (ICD9 669.70)      C Section (ICD9 669.70)      C Section (ICD9 669.70)          MEDICATIONS  (STANDING):  clotrimazole Lozenge 1 Lozenge Oral five times a day  fluconAZOLE IVPB      fluconAZOLE IVPB 100 milliGRAM(s) IV Intermittent every 24 hours  hydrALAZINE 10 milliGRAM(s) Oral three times a day  isosorbide   dinitrate Tablet (ISORDIL) 5 milliGRAM(s) Oral three times a day  pantoprazole    Tablet 40 milliGRAM(s) Oral before breakfast  rivaroxaban 10 milliGRAM(s) Oral daily      Allergies    Bacitracin ointment (Rash)  No Known Drug Allergies  latex (Rash)    Intolerances        SOCIAL HISTORY:  Denies ETOh,Smoking,     FAMILY HISTORY:      REVIEW OF SYSTEMS:    CONSTITUTIONAL: No weakness, fevers or chills  EYES/ENT: No visual changes;  No vertigo or throat pain   NECK: No pain or stiffness  RESPIRATORY: No cough, wheezing, hemoptysis; No shortness of breath  CARDIOVASCULAR: No chest pain or palpitations  GASTROINTESTINAL: No abdominal or epigastric pain. No nausea, vomiting, or hematemesis; No diarrhea or constipation. No melena or hematochezia.  GENITOURINARY: No dysuria, frequency or hematuria  NEUROLOGICAL: No numbness or weakness  SKIN: No itching, burning, rashes, or lesions   All other review of systems is negative unless indicated above.    VITAL:  T(C): , Max: 36.8 (06-15-23 @ 21:54)  T(F): , Max: 98.3 (06-15-23 @ 21:54)  HR: 85 (06-15-23 @ 21:54)  BP: 107/73 (06-15-23 @ 21:54)  BP(mean): --  RR: 18 (06-15-23 @ 21:54)  SpO2: 93% (06-15-23 @ 21:54)  Wt(kg): --    I and O's:     @ 07:  -   @ 07:00  --------------------------------------------------------  IN: 0 mL / OUT: 100 mL / NET: -100 mL     @ 07:01  -  06-15 @ 07:00  --------------------------------------------------------  IN: 930 mL / OUT: 850 mL / NET: 80 mL    06-15 @ 07:  -  16 @ 06:20  --------------------------------------------------------  IN: 0 mL / OUT: 450 mL / NET: -450 mL          PHYSICAL EXAM:    Constitutional: NAD  HEENT: PERRLA,   Neck: No JVD  Respiratory: CTA B/L  Cardiovascular: S1 and S2  Gastrointestinal: BS+, soft, NT/ND  Extremities: No peripheral edema  Neurological: A/O x 3, no focal deficits  Psychiatric: Normal mood, normal affect  : No Bazzi  Skin: No rashes  Access: Not applicable  Back: No CVA tenderness    LABS:                        12.3   6.76  )-----------( 276      ( 15 Arthur 2023 07:38 )             37.1     -15    142  |  101  |  7   ----------------------------<  103<H>  3.8   |  30  |  0.58    Ca    9.5      15 Arthur 2023 07:38            RADIOLOGY & ADDITIONAL STUDIES:

## 2023-06-16 NOTE — DISCHARGE NOTE NURSING/CASE MANAGEMENT/SOCIAL WORK - NSDCPEFALRISK_GEN_ALL_CORE
For information on Fall & Injury Prevention, visit: [email or web address] OR  [email or web address] OR  https://www.cdc.gov/steadi/patient.html

## 2023-06-16 NOTE — PROGRESS NOTE ADULT - SUBJECTIVE AND OBJECTIVE BOX
CARDIOLOGY FOLLOW UP - Dr. Tafoya  DATE OF SERVICE: 6/16/23    CC  No CV complaints    REVIEW OF SYSTEMS:  CONSTITUTIONAL: No fever, weight loss, or fatigue  RESPIRATORY: No cough, wheezing, chills or hemoptysis; No Shortness of Breath  CARDIOVASCULAR: No chest pain, palpitations, passing out, dizziness, or leg swelling  GASTROINTESTINAL: No abdominal or epigastric pain. No nausea, vomiting, or hematemesis; No diarrhea or constipation. No melena or hematochezia.  VASCULAR: No edema     PHYSICAL EXAM:  T(C): 36.6 (06-16-23 @ 05:29), Max: 36.8 (06-15-23 @ 21:54)  HR: 75 (06-16-23 @ 05:29) (75 - 86)  BP: 131/71 (06-16-23 @ 05:29) (107/73 - 131/71)  RR: 18 (06-16-23 @ 05:29) (18 - 18)  SpO2: 98% (06-16-23 @ 05:29) (93% - 98%)  Wt(kg): --  I&O's Summary    15 Arthur 2023 07:01  -  16 Jun 2023 07:00  --------------------------------------------------------  IN: 0 mL / OUT: 600 mL / NET: -600 mL        Appearance: Elderly female	  Cardiovascular: Normal S1 S2,RRR, No JVD, +Systolic murmur  Respiratory: Lungs clear to auscultation	  Gastrointestinal:  Soft, Non-tender, + BS	  Extremities: Normal range of motion, No clubbing, cyanosis or edema      Home Medications:  fluconazole 100 mg oral tablet: 2 tab(s) orally for 1 day then 1 tab once a day for 9 days (11 Jun 2023 10:18)  losartan 25 mg oral tablet: 1 tab(s) orally once a day (11 Jun 2023 10:18)  mercaptopurine 50 mg oral tablet: 1 tab(s) orally once a day (11 Jun 2023 10:18)  mesalamine 500 mg oral capsule, extended release: 2 cap(s) orally 4 times a day (11 Jun 2023 10:18)      MEDICATIONS  (STANDING):  clotrimazole Lozenge 1 Lozenge Oral five times a day  fluconAZOLE IVPB      fluconAZOLE IVPB 100 milliGRAM(s) IV Intermittent every 24 hours  hydrALAZINE 10 milliGRAM(s) Oral three times a day  isosorbide   dinitrate Tablet (ISORDIL) 5 milliGRAM(s) Oral three times a day  pantoprazole    Tablet 40 milliGRAM(s) Oral before breakfast  rivaroxaban 10 milliGRAM(s) Oral daily      TELEMETRY: 	    ECG:  	  RADIOLOGY:   DIAGNOSTIC TESTING:  [ ] Echocardiogram:  [ ]  Catheterization:  [ ] Stress Test:    OTHER: 	    LABS:	 	                            12.3   6.76  )-----------( 276      ( 15 Arthur 2023 07:38 )             37.1     06-15    142  |  101  |  7   ----------------------------<  103<H>  3.8   |  30  |  0.58    Ca    9.5      15 Arthur 2023 07:38

## 2023-06-16 NOTE — PROGRESS NOTE ADULT - ASSESSMENT
89 yo woman with ibd, has developed thrush and was given po diflucan but despite this she has ongoing dysphagia and gurgling. CT suggest aspiration and poor pulmonary toilet. ENT here shows some mild thrush but vocal cords work ok and no other pathology.  Suspect she has aspiration pneumonia with partially treated thrush.   Unclear if the dysphagia was caused by thrush alone or if she has some other cause.   No recent hospitalizations  sputum --normal resp tiarra  Recommendations:  complete Diflucan  dc planning  Ctx now dcd

## 2023-06-17 RX ORDER — PANTOPRAZOLE SODIUM 20 MG/1
1 TABLET, DELAYED RELEASE ORAL
Qty: 30 | Refills: 0
Start: 2023-06-17 | End: 2023-07-16

## 2023-06-19 ENCOUNTER — TRANSCRIPTION ENCOUNTER (OUTPATIENT)
Age: 88
End: 2023-06-19

## 2023-06-21 ENCOUNTER — APPOINTMENT (OUTPATIENT)
Age: 88
End: 2023-06-21
Payer: MEDICARE

## 2023-06-21 VITALS
TEMPERATURE: 97.1 F | OXYGEN SATURATION: 98 % | HEART RATE: 88 BPM | SYSTOLIC BLOOD PRESSURE: 126 MMHG | DIASTOLIC BLOOD PRESSURE: 72 MMHG | RESPIRATION RATE: 17 BRPM

## 2023-06-21 DIAGNOSIS — Z87.898 PERSONAL HISTORY OF OTHER SPECIFIED CONDITIONS: ICD-10-CM

## 2023-06-21 DIAGNOSIS — B37.0 CANDIDAL STOMATITIS: ICD-10-CM

## 2023-06-21 DIAGNOSIS — J69.0 PNEUMONITIS DUE TO INHALATION OF FOOD AND VOMIT: ICD-10-CM

## 2023-06-21 PROCEDURE — 99348 HOME/RES VST EST LOW MDM 30: CPT

## 2023-06-23 ENCOUNTER — NON-APPOINTMENT (OUTPATIENT)
Age: 88
End: 2023-06-23

## 2023-06-23 RX ORDER — NYSTATIN 100000 [USP'U]/ML
100000 SUSPENSION ORAL
Qty: 1 | Refills: 0 | Status: ACTIVE | COMMUNITY
Start: 2023-06-23 | End: 1900-01-01

## 2023-06-25 PROBLEM — Z87.898 HISTORY OF DYSPHAGIA: Status: RESOLVED | Noted: 2023-06-25 | Resolved: 2023-06-25

## 2023-06-25 PROBLEM — B37.0 ORAL THRUSH: Status: ACTIVE | Noted: 2023-06-23

## 2023-06-25 PROBLEM — J69.0 ASPIRATION PNEUMONIA: Status: ACTIVE | Noted: 2023-06-25

## 2023-06-25 RX ORDER — MESALAMINE 500 MG/1
500 CAPSULE, EXTENDED RELEASE ORAL 4 TIMES DAILY
Refills: 0 | Status: ACTIVE | COMMUNITY

## 2023-06-25 RX ORDER — HYDRALAZINE HYDROCHLORIDE 10 MG/1
10 TABLET ORAL
Refills: 0 | Status: ACTIVE | COMMUNITY

## 2023-06-25 RX ORDER — PANTOPRAZOLE 40 MG/1
40 TABLET, DELAYED RELEASE ORAL
Refills: 0 | Status: ACTIVE | COMMUNITY

## 2023-06-25 RX ORDER — ISOSORBIDE DINITRATE 5 MG/1
5 TABLET ORAL EVERY 8 HOURS
Refills: 0 | Status: ACTIVE | COMMUNITY

## 2023-06-25 NOTE — HISTORY OF PRESENT ILLNESS
[Post-hospitalization from ___ Hospital] : Post-hospitalization from [unfilled] Hospital [Admitted on: ___] : The patient was admitted on [unfilled] [Discharged on ___] : discharged on [unfilled] [Discharge Summary] : discharge summary [Pertinent Labs] : pertinent labs [Radiology Findings] : radiology findings [Discharge Med List] : discharge medication list [Med Reconciliation] : medication reconciliation has been completed [Patient Contacted By: ____] : and contacted by [unfilled] [FreeTextEntry3] : [facility] [place] [facility] Hospitalization Follow up [FreeTextEntry2] : Ms. Shankar is a 87 y/o woman with pmhx of HTN, Aortic stenosis, Vocal cord paralysis and dysphagia who presented to the ER with difficulty swallowing and oral candida along with minimal po intake. Admitted and treated for suspected aspiration pneumonia with partially treated thrush. MBS, ENT, Neurology and GI consulted. See discharge notes for full report. Discharged home with home care services. \par \par Ms. Shankar seen in the home today. HHA present during visit. She has 24/7 private hire aide. Observed patient sitting at edge of bed and in no distress. Currently, she reports feeling better but endorses some fatigue. She is adhering with recommended diet of soft bite size food and thick liquids by teaspoon along with chin tuck techniques. Denies chest pain, shortness of breath, coughing post swallowing, fever/chills and/or increased cough/sputum. Medications reviewed with patient and HHA. \par PCP, Dr. Bowden. F/u on 6/22/23\par

## 2023-06-25 NOTE — HEALTH RISK ASSESSMENT
[No] : No [No falls in past year] : Patient reported no falls in the past year [0] : 2) Feeling down, depressed, or hopeless: Not at all (0) [PHQ-2 Negative - No further assessment needed] : PHQ-2 Negative - No further assessment needed [HCY4Dolne] : 0 [Change in mental status noted] : No change in mental status noted [Feels Safe at Home] : Feels safe at home [Smoke Detector] : smoke detector [Carbon Monoxide Detector] : carbon monoxide detector [de-identified] : resides with private 24/7 aide [Former] : Former [> 15 Years] : > 15 Years

## 2023-06-25 NOTE — PHYSICAL EXAM
show [No Acute Distress] : no acute distress [Well-Appearing] : well-appearing [Normal Voice/Communication] : normal voice/communication [Normal Sclera/Conjunctiva] : normal sclera/conjunctiva [PERRL] : pupils equal round and reactive to light [No JVD] : no jugular venous distention [Supple] : supple [No Respiratory Distress] : no respiratory distress  [No Accessory Muscle Use] : no accessory muscle use [Normal Rate] : normal rate  [Regular Rhythm] : with a regular rhythm [Normal S1, S2] : normal S1 and S2 [Pedal Pulses Present] : the pedal pulses are present [No Edema] : there was no peripheral edema [No Extremity Clubbing/Cyanosis] : no extremity clubbing/cyanosis [Soft] : abdomen soft [Non Tender] : non-tender [Non-distended] : non-distended [Normal Bowel Sounds] : normal bowel sounds [No Joint Swelling] : no joint swelling [No Rash] : no rash [Normal Gait] : normal gait [Normal Affect] : the affect was normal [Alert and Oriented x3] : oriented to person, place, and time [Normal Insight/Judgement] : insight and judgment were intact [de-identified] : diminished breath sounds

## 2023-06-25 NOTE — PLAN
[FreeTextEntry1] : -follow up with Dr. Bowden on 6/22/23\par -Patient/family was informed about NP’s role/ STARS program and overview of transitional care reviewed with patient. Patient/family educated on topics of importance such as compliance with all provider visits, prescribed medication regimen, and low salt / heart healthy diet. Patient/Family encouraged calling NP with any issues, concerns or questions, also educated to notify NP if experiencing CP, SOB , cough, increased mucus/phlegm production, abdominal discomfort/swelling, difficulty sleeping or lying flat, fever, chills, fatigue, weight gain of 2-3lbs in 24 hours or 5lbs in one week, dizziness, lightheadedness, n/v/d/c, swelling to extremities and/or any c/o or concerns. Reassurance provided.

## 2023-06-25 NOTE — ASSESSMENT
[FreeTextEntry1] : Ms. Shankar is a pleasant 89 y/o woman with  pmhx of HTN, Aortic stenosis, Vocal cord paralysis and dysphagia with recent hospitalization for aspiration pneumonia.\par \par #aspiration pneumonia\par -hx of dysphagia\par -continue aspiration precautions-sit upright with all meals/drinks\par -soft bite size food with thick liquids with teaspoon\par -chin tuck techniques\par -home speech/swallow\par -f/u with PCP\par \par # Oral candidiasis/ thrush\par complete po antifungal medication\par ?meticulous oral care \par \par \par

## 2023-06-27 ENCOUNTER — TRANSCRIPTION ENCOUNTER (OUTPATIENT)
Age: 88
End: 2023-06-27

## 2023-10-02 NOTE — DISCHARGE NOTE NURSING/CASE MANAGEMENT/SOCIAL WORK - NSDPDISTO_GEN_ALL_CORE
Physical Therapy                 Therapy Communication Note    Patient Name: Tiffanie Pérez  MRN: [id number]  Today's Date: 10/2/2023     Discipline: Physical Therapy    Missed Visit Reason:  Unknown    Missed Time: Cancel    Comment:   Home with home care

## 2025-03-05 NOTE — CONSULT NOTE ADULT - CONSULT REASON
Dysphagia
Spoke with lab who stated it looks like  [facility] had difficulty processing the catecholamines urine sample. [facility]. Called pt and notified her to call  [facility] lab to find out status of catecholamines urine sample. Verbalized understanding and agreeable to plan of care.   
pneumonia, thrush
AS
thrush eval
hypoxic resp failure;